# Patient Record
Sex: FEMALE | Employment: FULL TIME | URBAN - METROPOLITAN AREA
[De-identification: names, ages, dates, MRNs, and addresses within clinical notes are randomized per-mention and may not be internally consistent; named-entity substitution may affect disease eponyms.]

---

## 2018-12-03 ENCOUNTER — HOSPITAL ENCOUNTER (OUTPATIENT)
Age: 28
Setting detail: SPECIMEN
Discharge: HOME | End: 2018-12-03
Payer: COMMERCIAL

## 2018-12-03 LAB — HCG QUANTITATIVE: 2511 IU/L

## 2018-12-06 ENCOUNTER — HOSPITAL ENCOUNTER (OUTPATIENT)
Age: 28
Setting detail: SPECIMEN
Discharge: HOME | End: 2018-12-06
Payer: COMMERCIAL

## 2018-12-06 LAB
DIRECT EXAM: ABNORMAL
Lab: ABNORMAL
SPECIMEN DESCRIPTION: ABNORMAL
STATUS: ABNORMAL

## 2018-12-07 LAB
CHLAMYDIA BY THIN PREP: NEGATIVE
N. GONORRHOEAE DNA, THIN PREP: NEGATIVE

## 2018-12-19 LAB — CYTOLOGY REPORT: NORMAL

## 2019-01-13 ENCOUNTER — HOSPITAL ENCOUNTER (EMERGENCY)
Age: 29
Discharge: HOME | End: 2019-01-14
Attending: EMERGENCY MEDICINE
Payer: COMMERCIAL

## 2019-01-13 ENCOUNTER — APPOINTMENT (OUTPATIENT)
Dept: ULTRASOUND IMAGING | Age: 29
End: 2019-01-13
Payer: COMMERCIAL

## 2019-01-13 DIAGNOSIS — O03.4 INCOMPLETE ABORTION: Primary | ICD-10-CM

## 2019-01-13 LAB
ABO: NORMAL
ALBUMIN SERPL-MCNC: 4.3 G/DL (ref 3.5–5.1)
ALP BLD-CCNC: 130 U/L (ref 38–126)
ALT SERPL-CCNC: 154 U/L (ref 11–66)
AMPHETAMINE+METHAMPHETAMINE URINE SCREEN: NEGATIVE
ANION GAP SERPL CALCULATED.3IONS-SCNC: 14 MEQ/L (ref 8–16)
ANTIBODY SCREEN: NORMAL
AST SERPL-CCNC: 95 U/L (ref 5–40)
BACTERIA: ABNORMAL /HPF
BARBITURATE QUANTITATIVE URINE: NEGATIVE
BASOPHILS # BLD: 0.3 %
BASOPHILS ABSOLUTE: 0 THOU/MM3 (ref 0–0.1)
BENZODIAZEPINE QUANTITATIVE URINE: NEGATIVE
BILIRUB SERPL-MCNC: 0.7 MG/DL (ref 0.3–1.2)
BILIRUBIN URINE: NEGATIVE
BLOOD, URINE: ABNORMAL
BUN BLDV-MCNC: 9 MG/DL (ref 7–22)
CALCIUM SERPL-MCNC: 9.6 MG/DL (ref 8.5–10.5)
CANNABINOID QUANTITATIVE URINE: NEGATIVE
CASTS 2: ABNORMAL /LPF
CASTS UA: ABNORMAL /LPF
CHARACTER, URINE: CLEAR
CHLORIDE BLD-SCNC: 104 MEQ/L (ref 98–111)
CO2: 21 MEQ/L (ref 23–33)
COCAINE METABOLITE QUANTITATIVE URINE: NEGATIVE
COLOR: YELLOW
CREAT SERPL-MCNC: 0.5 MG/DL (ref 0.4–1.2)
CRYSTALS, UA: ABNORMAL
EKG ATRIAL RATE: 92 BPM
EKG P AXIS: 64 DEGREES
EKG P-R INTERVAL: 168 MS
EKG Q-T INTERVAL: 364 MS
EKG QRS DURATION: 94 MS
EKG QTC CALCULATION (BAZETT): 450 MS
EKG R AXIS: 83 DEGREES
EKG T AXIS: 62 DEGREES
EKG VENTRICULAR RATE: 92 BPM
EOSINOPHIL # BLD: 0.7 %
EOSINOPHILS ABSOLUTE: 0.1 THOU/MM3 (ref 0–0.4)
EPITHELIAL CELLS, UA: ABNORMAL /HPF
ERYTHROCYTE [DISTWIDTH] IN BLOOD BY AUTOMATED COUNT: 12 % (ref 11.5–14.5)
ERYTHROCYTE [DISTWIDTH] IN BLOOD BY AUTOMATED COUNT: 40.2 FL (ref 35–45)
GFR SERPL CREATININE-BSD FRML MDRD: > 90 ML/MIN/1.73M2
GLUCOSE BLD-MCNC: 106 MG/DL (ref 70–108)
GLUCOSE URINE: NEGATIVE MG/DL
HCG,BETA SUBUNIT,QUAL,SERUM: 2932 MIU/ML (ref 0–5)
HCT VFR BLD CALC: 36.6 % (ref 37–47)
HEMOGLOBIN: 12.8 GM/DL (ref 12–16)
IMMATURE GRANS (ABS): 0.06 THOU/MM3 (ref 0–0.07)
IMMATURE GRANULOCYTES: 0.4 %
KETONES, URINE: ABNORMAL
LEUKOCYTE ESTERASE, URINE: ABNORMAL
LYMPHOCYTES # BLD: 11.5 %
LYMPHOCYTES ABSOLUTE: 1.7 THOU/MM3 (ref 1–4.8)
MAGNESIUM: 1.8 MG/DL (ref 1.6–2.4)
MCH RBC QN AUTO: 32.2 PG (ref 26–33)
MCHC RBC AUTO-ENTMCNC: 35 GM/DL (ref 32.2–35.5)
MCV RBC AUTO: 92.2 FL (ref 81–99)
MISCELLANEOUS 2: ABNORMAL
MONOCYTES # BLD: 7.6 %
MONOCYTES ABSOLUTE: 1.1 THOU/MM3 (ref 0.4–1.3)
NITRITE, URINE: NEGATIVE
NUCLEATED RED BLOOD CELLS: 0 /100 WBC
OPIATES, URINE: NEGATIVE
OSMOLALITY CALCULATION: 276.6 MOSMOL/KG (ref 275–300)
OXYCODONE: NEGATIVE
PH UA: 7
PHENCYCLIDINE QUANTITATIVE URINE: NEGATIVE
PLATELET # BLD: 382 THOU/MM3 (ref 130–400)
PMV BLD AUTO: 9.4 FL (ref 9.4–12.4)
POTASSIUM SERPL-SCNC: 3.8 MEQ/L (ref 3.5–5.2)
PROTEIN UA: NEGATIVE
RBC # BLD: 3.97 MILL/MM3 (ref 4.2–5.4)
RBC URINE: ABNORMAL /HPF
RENAL EPITHELIAL, UA: ABNORMAL
RH FACTOR: NORMAL
SEG NEUTROPHILS: 79.5 %
SEGMENTED NEUTROPHILS ABSOLUTE COUNT: 11.8 THOU/MM3 (ref 1.8–7.7)
SODIUM BLD-SCNC: 139 MEQ/L (ref 135–145)
SPECIFIC GRAVITY, URINE: < 1.005 (ref 1–1.03)
TOTAL PROTEIN: 7.9 G/DL (ref 6.1–8)
UROBILINOGEN, URINE: 0.2 EU/DL
WBC # BLD: 14.8 THOU/MM3 (ref 4.8–10.8)
WBC UA: ABNORMAL /HPF
YEAST: ABNORMAL

## 2019-01-13 PROCEDURE — 87086 URINE CULTURE/COLONY COUNT: CPT

## 2019-01-13 PROCEDURE — 80307 DRUG TEST PRSMV CHEM ANLYZR: CPT

## 2019-01-13 PROCEDURE — 86850 RBC ANTIBODY SCREEN: CPT

## 2019-01-13 PROCEDURE — 36415 COLL VENOUS BLD VENIPUNCTURE: CPT

## 2019-01-13 PROCEDURE — 93005 ELECTROCARDIOGRAM TRACING: CPT | Performed by: PHYSICIAN ASSISTANT

## 2019-01-13 PROCEDURE — 84702 CHORIONIC GONADOTROPIN TEST: CPT

## 2019-01-13 PROCEDURE — 86901 BLOOD TYPING SEROLOGIC RH(D): CPT

## 2019-01-13 PROCEDURE — 81001 URINALYSIS AUTO W/SCOPE: CPT

## 2019-01-13 PROCEDURE — 76801 OB US < 14 WKS SINGLE FETUS: CPT

## 2019-01-13 PROCEDURE — 6370000000 HC RX 637 (ALT 250 FOR IP)

## 2019-01-13 PROCEDURE — 86900 BLOOD TYPING SEROLOGIC ABO: CPT

## 2019-01-13 PROCEDURE — 99284 EMERGENCY DEPT VISIT MOD MDM: CPT

## 2019-01-13 PROCEDURE — 80053 COMPREHEN METABOLIC PANEL: CPT

## 2019-01-13 PROCEDURE — 83735 ASSAY OF MAGNESIUM: CPT

## 2019-01-13 PROCEDURE — 2580000003 HC RX 258: Performed by: PHYSICIAN ASSISTANT

## 2019-01-13 PROCEDURE — 2709999900 HC NON-CHARGEABLE SUPPLY

## 2019-01-13 PROCEDURE — 85025 COMPLETE CBC W/AUTO DIFF WBC: CPT

## 2019-01-13 RX ORDER — ACETAMINOPHEN 325 MG/1
650 TABLET ORAL ONCE
Status: DISCONTINUED | OUTPATIENT
Start: 2019-01-13 | End: 2019-01-13

## 2019-01-13 RX ORDER — 0.9 % SODIUM CHLORIDE 0.9 %
1000 INTRAVENOUS SOLUTION INTRAVENOUS ONCE
Status: COMPLETED | OUTPATIENT
Start: 2019-01-13 | End: 2019-01-14

## 2019-01-13 RX ORDER — ACETAMINOPHEN 325 MG/1
TABLET ORAL
Status: COMPLETED
Start: 2019-01-13 | End: 2019-01-13

## 2019-01-13 RX ORDER — ACETAMINOPHEN 325 MG/1
650 TABLET ORAL ONCE
Status: COMPLETED | OUTPATIENT
Start: 2019-01-13 | End: 2019-01-13

## 2019-01-13 RX ORDER — SODIUM CHLORIDE 9 MG/ML
INJECTION, SOLUTION INTRAVENOUS CONTINUOUS
Status: DISCONTINUED | OUTPATIENT
Start: 2019-01-14 | End: 2019-01-14 | Stop reason: HOSPADM

## 2019-01-13 RX ADMIN — SODIUM CHLORIDE 1000 ML: 9 INJECTION, SOLUTION INTRAVENOUS at 21:34

## 2019-01-13 RX ADMIN — ACETAMINOPHEN 650 MG: 325 TABLET ORAL at 21:37

## 2019-01-13 ASSESSMENT — ENCOUNTER SYMPTOMS
EYE DISCHARGE: 0
NAUSEA: 0
VOMITING: 0
SHORTNESS OF BREATH: 0
WHEEZING: 0
BACK PAIN: 0
SORE THROAT: 0
EYE PAIN: 0
RHINORRHEA: 0
COUGH: 0
DIARRHEA: 0
ABDOMINAL PAIN: 1
CONSTIPATION: 0

## 2019-01-13 ASSESSMENT — PAIN SCALES - GENERAL
PAINLEVEL_OUTOF10: 4
PAINLEVEL_OUTOF10: 8
PAINLEVEL_OUTOF10: 8

## 2019-01-13 ASSESSMENT — PAIN DESCRIPTION - LOCATION: LOCATION: ABDOMEN

## 2019-01-13 ASSESSMENT — PAIN DESCRIPTION - PAIN TYPE: TYPE: ACUTE PAIN

## 2019-01-13 ASSESSMENT — PAIN DESCRIPTION - DESCRIPTORS: DESCRIPTORS: CRAMPING

## 2019-01-14 VITALS
RESPIRATION RATE: 16 BRPM | DIASTOLIC BLOOD PRESSURE: 54 MMHG | BODY MASS INDEX: 26.87 KG/M2 | TEMPERATURE: 98 F | HEIGHT: 62 IN | SYSTOLIC BLOOD PRESSURE: 112 MMHG | HEART RATE: 92 BPM | OXYGEN SATURATION: 98 % | WEIGHT: 146 LBS

## 2019-01-14 PROCEDURE — 6370000000 HC RX 637 (ALT 250 FOR IP): Performed by: EMERGENCY MEDICINE

## 2019-01-14 PROCEDURE — 93010 ELECTROCARDIOGRAM REPORT: CPT | Performed by: NUCLEAR MEDICINE

## 2019-01-14 RX ORDER — MISOPROSTOL 200 UG/1
200 TABLET ORAL EVERY 4 HOURS
Qty: 3 TABLET | Refills: 0 | Status: ON HOLD | OUTPATIENT
Start: 2019-01-14 | End: 2020-01-03 | Stop reason: HOSPADM

## 2019-01-14 RX ORDER — MISOPROSTOL 200 UG/1
200 TABLET ORAL ONCE
Status: COMPLETED | OUTPATIENT
Start: 2019-01-14 | End: 2019-01-14

## 2019-01-14 RX ADMIN — MISOPROSTOL 200 MCG: 200 TABLET ORAL at 00:49

## 2019-01-15 LAB
ORGANISM: ABNORMAL
URINE CULTURE REFLEX: ABNORMAL

## 2020-01-02 ENCOUNTER — HOSPITAL ENCOUNTER (INPATIENT)
Age: 30
LOS: 2 days | Discharge: HOME | End: 2020-01-04
Attending: OBSTETRICS & GYNECOLOGY | Admitting: OBSTETRICS & GYNECOLOGY
Payer: COMMERCIAL

## 2020-01-02 ENCOUNTER — ANESTHESIA (OUTPATIENT)
Dept: LABOR AND DELIVERY | Age: 30
End: 2020-01-02
Payer: COMMERCIAL

## 2020-01-02 ENCOUNTER — ANESTHESIA EVENT (OUTPATIENT)
Dept: LABOR AND DELIVERY | Age: 30
End: 2020-01-02
Payer: COMMERCIAL

## 2020-01-02 ENCOUNTER — APPOINTMENT (OUTPATIENT)
Dept: LABOR AND DELIVERY | Age: 30
End: 2020-01-02
Payer: COMMERCIAL

## 2020-01-02 LAB
ABO: NORMAL
AMPHETAMINE+METHAMPHETAMINE URINE SCREEN: NEGATIVE
ANTIBODY SCREEN: NORMAL
BARBITURATE QUANTITATIVE URINE: NEGATIVE
BASOPHILS # BLD: 0.3 %
BASOPHILS ABSOLUTE: 0 THOU/MM3 (ref 0–0.1)
BENZODIAZEPINE QUANTITATIVE URINE: NEGATIVE
CANNABINOID QUANTITATIVE URINE: NEGATIVE
COCAINE METABOLITE QUANTITATIVE URINE: NEGATIVE
EOSINOPHIL # BLD: 2 %
EOSINOPHILS ABSOLUTE: 0.2 THOU/MM3 (ref 0–0.4)
ERYTHROCYTE [DISTWIDTH] IN BLOOD BY AUTOMATED COUNT: 13.2 % (ref 11.5–14.5)
ERYTHROCYTE [DISTWIDTH] IN BLOOD BY AUTOMATED COUNT: 46.2 FL (ref 35–45)
HCT VFR BLD CALC: 34.1 % (ref 37–47)
HEMOGLOBIN: 11.7 GM/DL (ref 12–16)
IMMATURE GRANS (ABS): 0.08 THOU/MM3 (ref 0–0.07)
IMMATURE GRANULOCYTES: 0.7 %
LYMPHOCYTES # BLD: 22.4 %
LYMPHOCYTES ABSOLUTE: 2.7 THOU/MM3 (ref 1–4.8)
MCH RBC QN AUTO: 32.9 PG (ref 26–33)
MCHC RBC AUTO-ENTMCNC: 34.3 GM/DL (ref 32.2–35.5)
MCV RBC AUTO: 95.8 FL (ref 81–99)
MONOCYTES # BLD: 10.7 %
MONOCYTES ABSOLUTE: 1.3 THOU/MM3 (ref 0.4–1.3)
NUCLEATED RED BLOOD CELLS: 0 /100 WBC
OPIATES, URINE: NEGATIVE
OXYCODONE: NEGATIVE
PHENCYCLIDINE QUANTITATIVE URINE: NEGATIVE
PLATELET # BLD: 272 THOU/MM3 (ref 130–400)
PMV BLD AUTO: 10.7 FL (ref 9.4–12.4)
RBC # BLD: 3.56 MILL/MM3 (ref 4.2–5.4)
RH FACTOR: NORMAL
RPR: NONREACTIVE
SEG NEUTROPHILS: 63.9 %
SEGMENTED NEUTROPHILS ABSOLUTE COUNT: 7.8 THOU/MM3 (ref 1.8–7.7)
WBC # BLD: 12.2 THOU/MM3 (ref 4.8–10.8)

## 2020-01-02 PROCEDURE — 6360000002 HC RX W HCPCS

## 2020-01-02 PROCEDURE — 86901 BLOOD TYPING SEROLOGIC RH(D): CPT

## 2020-01-02 PROCEDURE — 2580000003 HC RX 258: Performed by: OBSTETRICS & GYNECOLOGY

## 2020-01-02 PROCEDURE — 3700000025 EPIDURAL BLOCK: Performed by: ANESTHESIOLOGY

## 2020-01-02 PROCEDURE — 2709999900 HC NON-CHARGEABLE SUPPLY

## 2020-01-02 PROCEDURE — 80307 DRUG TEST PRSMV CHEM ANLYZR: CPT

## 2020-01-02 PROCEDURE — 90715 TDAP VACCINE 7 YRS/> IM: CPT | Performed by: OBSTETRICS & GYNECOLOGY

## 2020-01-02 PROCEDURE — 36415 COLL VENOUS BLD VENIPUNCTURE: CPT

## 2020-01-02 PROCEDURE — 85025 COMPLETE CBC W/AUTO DIFF WBC: CPT

## 2020-01-02 PROCEDURE — 6360000002 HC RX W HCPCS: Performed by: OBSTETRICS & GYNECOLOGY

## 2020-01-02 PROCEDURE — 86900 BLOOD TYPING SEROLOGIC ABO: CPT

## 2020-01-02 PROCEDURE — 6360000002 HC RX W HCPCS: Performed by: NURSE ANESTHETIST, CERTIFIED REGISTERED

## 2020-01-02 PROCEDURE — 88307 TISSUE EXAM BY PATHOLOGIST: CPT

## 2020-01-02 PROCEDURE — 10907ZC DRAINAGE OF AMNIOTIC FLUID, THERAPEUTIC FROM PRODUCTS OF CONCEPTION, VIA NATURAL OR ARTIFICIAL OPENING: ICD-10-PCS | Performed by: OBSTETRICS & GYNECOLOGY

## 2020-01-02 PROCEDURE — 1200000000 HC SEMI PRIVATE

## 2020-01-02 PROCEDURE — 90471 IMMUNIZATION ADMIN: CPT | Performed by: OBSTETRICS & GYNECOLOGY

## 2020-01-02 PROCEDURE — 6370000000 HC RX 637 (ALT 250 FOR IP): Performed by: OBSTETRICS & GYNECOLOGY

## 2020-01-02 PROCEDURE — 86592 SYPHILIS TEST NON-TREP QUAL: CPT

## 2020-01-02 PROCEDURE — 7200000001 HC VAGINAL DELIVERY

## 2020-01-02 PROCEDURE — 86850 RBC ANTIBODY SCREEN: CPT

## 2020-01-02 PROCEDURE — 2500000003 HC RX 250 WO HCPCS: Performed by: ANESTHESIOLOGY

## 2020-01-02 PROCEDURE — 3E033VJ INTRODUCTION OF OTHER HORMONE INTO PERIPHERAL VEIN, PERCUTANEOUS APPROACH: ICD-10-PCS | Performed by: OBSTETRICS & GYNECOLOGY

## 2020-01-02 RX ORDER — DIPHENHYDRAMINE HYDROCHLORIDE 50 MG/ML
25 INJECTION INTRAMUSCULAR; INTRAVENOUS EVERY 4 HOURS PRN
Status: DISCONTINUED | OUTPATIENT
Start: 2020-01-02 | End: 2020-01-02 | Stop reason: HOSPADM

## 2020-01-02 RX ORDER — DOCUSATE SODIUM 100 MG/1
100 CAPSULE, LIQUID FILLED ORAL 2 TIMES DAILY
Status: DISCONTINUED | OUTPATIENT
Start: 2020-01-02 | End: 2020-01-04 | Stop reason: HOSPADM

## 2020-01-02 RX ORDER — SODIUM CHLORIDE 0.9 % (FLUSH) 0.9 %
10 SYRINGE (ML) INJECTION EVERY 12 HOURS SCHEDULED
Status: DISCONTINUED | OUTPATIENT
Start: 2020-01-02 | End: 2020-01-04 | Stop reason: HOSPADM

## 2020-01-02 RX ORDER — SODIUM CHLORIDE 0.9 % (FLUSH) 0.9 %
10 SYRINGE (ML) INJECTION PRN
Status: DISCONTINUED | OUTPATIENT
Start: 2020-01-02 | End: 2020-01-04 | Stop reason: HOSPADM

## 2020-01-02 RX ORDER — NALOXONE HYDROCHLORIDE 0.4 MG/ML
0.4 INJECTION, SOLUTION INTRAMUSCULAR; INTRAVENOUS; SUBCUTANEOUS PRN
Status: DISCONTINUED | OUTPATIENT
Start: 2020-01-02 | End: 2020-01-02 | Stop reason: HOSPADM

## 2020-01-02 RX ORDER — ROPIVACAINE HYDROCHLORIDE 2 MG/ML
INJECTION, SOLUTION EPIDURAL; INFILTRATION; PERINEURAL
Status: COMPLETED
Start: 2020-01-02 | End: 2020-01-02

## 2020-01-02 RX ORDER — BUTORPHANOL TARTRATE 1 MG/ML
1 INJECTION, SOLUTION INTRAMUSCULAR; INTRAVENOUS
Status: DISCONTINUED | OUTPATIENT
Start: 2020-01-02 | End: 2020-01-02 | Stop reason: HOSPADM

## 2020-01-02 RX ORDER — FERROUS SULFATE 325(65) MG
325 TABLET ORAL
Status: DISCONTINUED | OUTPATIENT
Start: 2020-01-03 | End: 2020-01-04 | Stop reason: HOSPADM

## 2020-01-02 RX ORDER — METHYLERGONOVINE MALEATE 0.2 MG/ML
200 INJECTION INTRAVENOUS PRN
Status: DISCONTINUED | OUTPATIENT
Start: 2020-01-02 | End: 2020-01-02 | Stop reason: HOSPADM

## 2020-01-02 RX ORDER — LANOLIN 100 %
OINTMENT (GRAM) TOPICAL PRN
Status: DISCONTINUED | OUTPATIENT
Start: 2020-01-02 | End: 2020-01-04 | Stop reason: HOSPADM

## 2020-01-02 RX ORDER — DIPHENHYDRAMINE HCL 25 MG
25 TABLET ORAL EVERY 6 HOURS PRN
Status: DISCONTINUED | OUTPATIENT
Start: 2020-01-02 | End: 2020-01-04 | Stop reason: HOSPADM

## 2020-01-02 RX ORDER — MORPHINE SULFATE 2 MG/ML
2 INJECTION, SOLUTION INTRAMUSCULAR; INTRAVENOUS
Status: DISCONTINUED | OUTPATIENT
Start: 2020-01-02 | End: 2020-01-02 | Stop reason: HOSPADM

## 2020-01-02 RX ORDER — SODIUM CHLORIDE, SODIUM LACTATE, POTASSIUM CHLORIDE, CALCIUM CHLORIDE 600; 310; 30; 20 MG/100ML; MG/100ML; MG/100ML; MG/100ML
INJECTION, SOLUTION INTRAVENOUS CONTINUOUS
Status: DISCONTINUED | OUTPATIENT
Start: 2020-01-02 | End: 2020-01-02

## 2020-01-02 RX ORDER — ROPIVACAINE HYDROCHLORIDE 2 MG/ML
INJECTION, SOLUTION EPIDURAL; INFILTRATION; PERINEURAL PRN
Status: DISCONTINUED | OUTPATIENT
Start: 2020-01-02 | End: 2020-01-02 | Stop reason: SDUPTHER

## 2020-01-02 RX ORDER — MORPHINE SULFATE 4 MG/ML
4 INJECTION, SOLUTION INTRAMUSCULAR; INTRAVENOUS
Status: DISCONTINUED | OUTPATIENT
Start: 2020-01-02 | End: 2020-01-02 | Stop reason: HOSPADM

## 2020-01-02 RX ORDER — CARBOPROST TROMETHAMINE 250 UG/ML
250 INJECTION, SOLUTION INTRAMUSCULAR PRN
Status: CANCELLED | OUTPATIENT
Start: 2020-01-02

## 2020-01-02 RX ORDER — ONDANSETRON 4 MG/1
8 TABLET, FILM COATED ORAL EVERY 8 HOURS PRN
Status: DISCONTINUED | OUTPATIENT
Start: 2020-01-02 | End: 2020-01-04 | Stop reason: HOSPADM

## 2020-01-02 RX ORDER — IBUPROFEN 800 MG/1
800 TABLET ORAL EVERY 8 HOURS PRN
Status: DISCONTINUED | OUTPATIENT
Start: 2020-01-02 | End: 2020-01-02 | Stop reason: HOSPADM

## 2020-01-02 RX ORDER — MISOPROSTOL 200 UG/1
1000 TABLET ORAL PRN
Status: DISCONTINUED | OUTPATIENT
Start: 2020-01-02 | End: 2020-01-02 | Stop reason: HOSPADM

## 2020-01-02 RX ORDER — ONDANSETRON 2 MG/ML
8 INJECTION INTRAMUSCULAR; INTRAVENOUS EVERY 6 HOURS PRN
Status: DISCONTINUED | OUTPATIENT
Start: 2020-01-02 | End: 2020-01-02 | Stop reason: HOSPADM

## 2020-01-02 RX ORDER — SODIUM CHLORIDE, SODIUM LACTATE, POTASSIUM CHLORIDE, CALCIUM CHLORIDE 600; 310; 30; 20 MG/100ML; MG/100ML; MG/100ML; MG/100ML
INJECTION, SOLUTION INTRAVENOUS CONTINUOUS
Status: DISCONTINUED | OUTPATIENT
Start: 2020-01-02 | End: 2020-01-04 | Stop reason: HOSPADM

## 2020-01-02 RX ORDER — ONDANSETRON 2 MG/ML
4 INJECTION INTRAMUSCULAR; INTRAVENOUS EVERY 6 HOURS PRN
Status: DISCONTINUED | OUTPATIENT
Start: 2020-01-02 | End: 2020-01-02 | Stop reason: HOSPADM

## 2020-01-02 RX ORDER — ONDANSETRON 2 MG/ML
4 INJECTION INTRAMUSCULAR; INTRAVENOUS EVERY 6 HOURS PRN
Status: DISCONTINUED | OUTPATIENT
Start: 2020-01-02 | End: 2020-01-04 | Stop reason: HOSPADM

## 2020-01-02 RX ORDER — ACETAMINOPHEN 325 MG/1
650 TABLET ORAL EVERY 4 HOURS PRN
Status: DISCONTINUED | OUTPATIENT
Start: 2020-01-02 | End: 2020-01-04 | Stop reason: HOSPADM

## 2020-01-02 RX ORDER — TERBUTALINE SULFATE 1 MG/ML
0.25 INJECTION, SOLUTION SUBCUTANEOUS
Status: DISCONTINUED | OUTPATIENT
Start: 2020-01-02 | End: 2020-01-02 | Stop reason: HOSPADM

## 2020-01-02 RX ORDER — ACETAMINOPHEN 325 MG/1
650 TABLET ORAL EVERY 4 HOURS PRN
Status: DISCONTINUED | OUTPATIENT
Start: 2020-01-02 | End: 2020-01-02 | Stop reason: HOSPADM

## 2020-01-02 RX ORDER — NALBUPHINE HCL 10 MG/ML
5 AMPUL (ML) INJECTION EVERY 4 HOURS PRN
Status: DISCONTINUED | OUTPATIENT
Start: 2020-01-02 | End: 2020-01-02 | Stop reason: HOSPADM

## 2020-01-02 RX ORDER — SEVOFLURANE 250 ML/250ML
1 LIQUID RESPIRATORY (INHALATION) CONTINUOUS PRN
Status: DISCONTINUED | OUTPATIENT
Start: 2020-01-02 | End: 2020-01-02 | Stop reason: HOSPADM

## 2020-01-02 RX ORDER — IBUPROFEN 800 MG/1
800 TABLET ORAL EVERY 8 HOURS
Status: DISCONTINUED | OUTPATIENT
Start: 2020-01-02 | End: 2020-01-04 | Stop reason: HOSPADM

## 2020-01-02 RX ORDER — SODIUM CHLORIDE 0.9 % (FLUSH) 0.9 %
10 SYRINGE (ML) INJECTION EVERY 12 HOURS SCHEDULED
Status: DISCONTINUED | OUTPATIENT
Start: 2020-01-02 | End: 2020-01-02

## 2020-01-02 RX ORDER — LIDOCAINE HYDROCHLORIDE 10 MG/ML
30 INJECTION, SOLUTION EPIDURAL; INFILTRATION; INTRACAUDAL; PERINEURAL PRN
Status: DISCONTINUED | OUTPATIENT
Start: 2020-01-02 | End: 2020-01-02 | Stop reason: HOSPADM

## 2020-01-02 RX ORDER — METHYLERGONOVINE MALEATE 0.2 MG/ML
200 INJECTION INTRAVENOUS PRN
Status: DISCONTINUED | OUTPATIENT
Start: 2020-01-02 | End: 2020-01-04 | Stop reason: HOSPADM

## 2020-01-02 RX ORDER — SODIUM CHLORIDE 0.9 % (FLUSH) 0.9 %
10 SYRINGE (ML) INJECTION PRN
Status: DISCONTINUED | OUTPATIENT
Start: 2020-01-02 | End: 2020-01-02

## 2020-01-02 RX ORDER — CARBOPROST TROMETHAMINE 250 UG/ML
250 INJECTION, SOLUTION INTRAMUSCULAR
Status: ACTIVE | OUTPATIENT
Start: 2020-01-02 | End: 2020-01-02

## 2020-01-02 RX ORDER — ZOLPIDEM TARTRATE 5 MG/1
5 TABLET ORAL NIGHTLY PRN
Status: DISCONTINUED | OUTPATIENT
Start: 2020-01-02 | End: 2020-01-04 | Stop reason: HOSPADM

## 2020-01-02 RX ADMIN — DOCUSATE SODIUM 100 MG: 100 CAPSULE, LIQUID FILLED ORAL at 19:50

## 2020-01-02 RX ADMIN — ROPIVACAINE HYDROCHLORIDE 10 ML: 2 INJECTION, SOLUTION EPIDURAL; INFILTRATION at 09:16

## 2020-01-02 RX ADMIN — SODIUM CHLORIDE, POTASSIUM CHLORIDE, SODIUM LACTATE AND CALCIUM CHLORIDE: 600; 310; 30; 20 INJECTION, SOLUTION INTRAVENOUS at 09:34

## 2020-01-02 RX ADMIN — Medication 1 MILLI-UNITS/MIN: at 06:35

## 2020-01-02 RX ADMIN — Medication 18 ML/HR: at 09:18

## 2020-01-02 RX ADMIN — SODIUM CHLORIDE, POTASSIUM CHLORIDE, SODIUM LACTATE AND CALCIUM CHLORIDE: 600; 310; 30; 20 INJECTION, SOLUTION INTRAVENOUS at 06:20

## 2020-01-02 RX ADMIN — TETANUS TOXOID, REDUCED DIPHTHERIA TOXOID AND ACELLULAR PERTUSSIS VACCINE, ADSORBED 0.5 ML: 5; 2.5; 8; 8; 2.5 SUSPENSION INTRAMUSCULAR at 19:50

## 2020-01-02 RX ADMIN — IBUPROFEN 800 MG: 800 TABLET, FILM COATED ORAL at 16:22

## 2020-01-02 ASSESSMENT — PAIN SCALES - GENERAL: PAINLEVEL_OUTOF10: 5

## 2020-01-02 NOTE — PLAN OF CARE
Problem: Pain:  Goal: Pain level will decrease  Description  Pain level will decrease  Outcome: Ongoing  Note:   Scheduled motrin given. Pain goal noted to be a 3. Problem: Discharge Planning:  Goal: Discharged to appropriate level of care  Description  Discharged to appropriate level of care  Outcome: Ongoing  Note:   Plan is to be discharged home with significant other. Problem: Constipation:  Goal: Bowel elimination is within specified parameters  Description  Bowel elimination is within specified parameters  Outcome: Ongoing  Note:   Patient has not passed gas at this time. Problem: Fluid Volume - Imbalance:  Goal: Absence of imbalanced fluid volume signs and symptoms  Description  Absence of imbalanced fluid volume signs and symptoms  Outcome: Ongoing  Note:   Vaginal bleeding WNL, no clots or foul odors. Problem: Infection - Risk of, Puerperal Infection:  Goal: Will show no infection signs and symptoms  Description  Will show no infection signs and symptoms  Outcome: Ongoing  Note:   Afebrile this shift. Problem: Mood - Altered:  Goal: Mood stable  Description  Mood stable  Outcome: Ongoing  Note:   Emotional support provided. Care plan reviewed with patient and she contributes to goal setting and voices understanding of plan of care.

## 2020-01-02 NOTE — FLOWSHEET NOTE
Patient arrived to [phone] via wheelchair with  in arms. Report received from [address] [place] [address]. Patient oriented to room, plan of care, call light, pain goal, ducks in a row, rooming in. Trice Londono  Patient up to bathroom at this time. Successful void. Small amount of lochia noted no clots. Patient back to bed. Fundus midline firm at U/-1. IV out per order.  Trice Nolan

## 2020-01-02 NOTE — PLAN OF CARE
appropriate level of care  Description  Discharged to appropriate level of care  Outcome: Ongoing  Note:   Discussed infant care and breast feeding. Care plan reviewed with patient and family. Patient and family verbalize understanding of the plan of care and contribute to goal setting.

## 2020-01-02 NOTE — FLOWSHEET NOTE
Pt arrives for scheduled induction. Pt endorses +FM, denies contractions, vaginal bleeding or lof. Patient to BR to void, informed of maternal drug testing policy in place on all laboring patients. Consent signed and urine sent.

## 2020-01-02 NOTE — H&P
[address] [address]  History and Physical Update    Pt Name: Josue Eller  MRN: [id number]  YOB: 1990  Date of evaluation: 2020    [x] I have examined the patient and reviewed the H&P/Consult and there are no changes to the patient or plans. 28yo  at 38/4 weeks here for IOL due to IUGR 8th% and resistive cord dopplers. FHT category 1. AROM for clear fluid return. GBS negative.     [] I have examined the patient and reviewed the H&P/Consult and have noted the following changes:            Jaja Gilliland MD  Electronically signed 2020 at 8:39 AM

## 2020-01-02 NOTE — FLOWSHEET NOTE
Recovery period ended. Pt to mother baby per wheelchair. Report to April RN and care assumed. Cond stable.

## 2020-01-02 NOTE — ANESTHESIA PRE PROCEDURE
Department of Anesthesiology  Preprocedure Note       Name:  Jean Humphries   Age:  34 y.o.  :  1990                                          MRN:  [id number]         Date:  2020      Surgeon: * No surgeons listed *    Procedure: Labor Analgesia    Medications prior to admission:   Prior to Admission medications    Medication Sig Start Date End Date Taking?  Authorizing Provider   Prenatal Vit-Fe Fumarate-FA (PRENATAL 1+1 PO) Take by mouth   Yes Historical Provider, MD   misoprostol (CYTOTEC) 200 MCG tablet Take 1 tablet by mouth every 4 hours for 3 doses 1/14/19 1/15/19  Shikha Sim MD       Current medications:    Current Facility-Administered Medications   Medication Dose Route Frequency Provider Last Rate Last Dose    lactated ringers infusion   Intravenous Continuous Buddy Miguel  mL/hr at 20 0620      sodium chloride flush 0.9 % injection 10 mL  10 mL Intravenous 2 times per day Buddy Miguel MD        sodium chloride flush 0.9 % injection 10 mL  10 mL Intravenous PRN Buddy Miguel MD        lidocaine PF 1 % injection 30 mL  30 mL Other PRN Buddy Miguel MD        butorphanol (STADOL) injection 1 mg  1 mg Intravenous Q1H PRN Buddy Miguel MD        nitrous oxide 50% inhalation 1 each  1 each Inhalation Continuous PRN MD Hortencia Reza acetaminophen (TYLENOL) tablet 650 mg  650 mg Oral Q4H PRN Buddy Miguel MD        ibuprofen (ADVIL;MOTRIN) tablet 800 mg  800 mg Oral Q8H PRN Buddy Miguel MD        morphine (PF) injection 2 mg  2 mg Intravenous Q2H PRN MD Robert Reza morphine injection 4 mg  4 mg Intravenous Q2H PRN Buddy Miguel MD        oxytocin (PITOCIN) 30 units in 500 mL infusion  1 fred-units/min Intravenous Continuous Buddy Miguel MD 4 mL/hr at 20 0755 4 fred-units/min at 20 0755    diphenhydrAMINE (BENADRYL) injection 25 mg  25 mg Intravenous Q4H PRN Buddy Miguel MD        ondansetron [facility]) injection 8 mg  8 mg

## 2020-01-02 NOTE — PLAN OF CARE
Problem: Anxiety:  Goal: Level of anxiety will decrease  Description  Level of anxiety will decrease  1/2/2020 0747 by Ric Rapp RN  Note:   Pt calm and cooperative. Denies needs or complaints. Problem: Breathing Pattern - Ineffective:  Goal: Able to breathe comfortably  Description  Able to breathe comfortably  1/2/2020 0747 by Ric Rapp RN  Note:   Color pink. Resp with ease. Pulse Ox WNL     Problem: Fluid Volume - Imbalance:  Goal: Absence of intrapartum hemorrhage signs and symptoms  Description  Absence of intrapartum hemorrhage signs and symptoms  1/2/2020 0747 by Ric Rapp RN  Note:   No signs of hemorrhage, labs stable     Problem: Infection - Intrapartum Infection:  Goal: Will show no infection signs and symptoms  Description  Will show no infection signs and symptoms  1/2/2020 0747 by Ric Rapp RN  Note:   Afebrile, no signs of infection     Problem: Labor Process - Prolonged:  Goal: Uterine contractions within specified parameters  Description  Uterine contractions within specified parameters  1/2/2020 0747 by Ric Rapp RN  Note:   Having irregular contractions with pitocin running     Problem: Pain - Acute:  Goal: Pain level will decrease  Description  Pain level will decrease  1/2/2020 0747 by Ric Rapp RN  Note:   Pain goal 10. Planning epidural for pain.  Denies pain at the current time     Problem: Tissue Perfusion - Uteroplacental, Altered:  Goal: Absence of abnormal fetal heart rate pattern  Description  Absence of abnormal fetal heart rate pattern  1/2/2020 0747 by Ric Rapp RN  Note:   Reactive FHR tracing     Problem: Urinary Retention:  Goal: Urinary elimination within specified parameters  Description  Urinary elimination within specified parameters  1/2/2020 0747 by Ric Rapp RN  Note:   Voids freely  Problem: Discharge Planning:  Goal: Discharged to appropriate level of care  Description  Discharged to appropriate level of care  1/2/2020

## 2020-01-02 NOTE — L&D DELIVERY NOTE
Department of Obstetrics and Gynecology   Vaginal Delivery Note at [facility]  Date of Delivery:  2020    Procedure:  Spontaneous vaginal delivery    Surgeon:   CYNTHIA Gil    Past Medical History:   Past Medical History:   Diagnosis Date    Anxiety     Mental disorder        Anesthesia:  epidural anesthesia    Estimated blood loss:  300ml    Specimen:  Placenta not sent to pathology     Cord blood sent Yes    Complications:  none    Condition:  infant stable to general nursery and mother stable    Details of Procedure: The patient is a 34 y.o. female at 38w3d   OB History        3    Para   1    Term   1            AB   1    Living   1       SAB   1    TAB        Ectopic        Molar        Multiple        Live Births   1             who was admitted for induction. She received the following interventions: ARBOW and IV Pitocin induction. The patient progressed well,did receive an epidural, became complete and started to push. Patient progressed well and the fetal head was delivered over an intact perineum, nose and mouth suctioned with bulb suction and the rest of the infant delivered atraumatically, placed on mother abdomen. Cord was clamped and cut and infant handed off to the waiting nurse for evaluation. The delivery of the placenta was spontaneous. The perineum and vagina were explored and a no  laceration was repaired. Vaginal sweep was performed and there were no sponges retained in the vagina. All sharps were discarded.     Infant Wt:   Information for the patient's :  Kathryn Race [[id number]]             APGARS:     Information for the patient's :  Kathryn Race [[id number]]          Kendell Costa

## 2020-01-03 PROCEDURE — 1200000000 HC SEMI PRIVATE

## 2020-01-03 PROCEDURE — 6370000000 HC RX 637 (ALT 250 FOR IP): Performed by: OBSTETRICS & GYNECOLOGY

## 2020-01-03 RX ORDER — PSEUDOEPHEDRINE HCL 30 MG
100 TABLET ORAL 2 TIMES DAILY PRN
COMMUNITY
Start: 2020-01-03

## 2020-01-03 RX ORDER — IBUPROFEN 200 MG
800 TABLET ORAL EVERY 8 HOURS PRN
COMMUNITY
Start: 2020-01-03

## 2020-01-03 RX ADMIN — DOCUSATE SODIUM 100 MG: 100 CAPSULE, LIQUID FILLED ORAL at 21:06

## 2020-01-03 RX ADMIN — IBUPROFEN 800 MG: 800 TABLET, FILM COATED ORAL at 00:21

## 2020-01-03 RX ADMIN — ACETAMINOPHEN 650 MG: 325 TABLET ORAL at 15:21

## 2020-01-03 RX ADMIN — IBUPROFEN 800 MG: 800 TABLET, FILM COATED ORAL at 08:15

## 2020-01-03 RX ADMIN — ACETAMINOPHEN 650 MG: 325 TABLET ORAL at 23:51

## 2020-01-03 RX ADMIN — IBUPROFEN 800 MG: 800 TABLET, FILM COATED ORAL at 19:00

## 2020-01-03 ASSESSMENT — PAIN SCALES - GENERAL
PAINLEVEL_OUTOF10: 5
PAINLEVEL_OUTOF10: 3
PAINLEVEL_OUTOF10: 5
PAINLEVEL_OUTOF10: 8

## 2020-01-03 NOTE — DISCHARGE SUMMARY
Vaginal Delivery Discharge Summary    Gestational Age:38w4d    Antepartum complications: IUGR, abnormal fetal dopplers    Date of Delivery: 1/3/2020     Condition: Good     Type of Delivery: Vaginal     [place] Data  Information for the patient's :  Sweetie Husain [[id number]]   male  Birth Weight: 6 lb 1.7 oz (2.77 kg)      Labs: CBC   Lab Results   Component Value Date    HGB 11.7 (L) 2020    HCT 34.1 (L) 2020        Intrapartum complications: None    Postpartum complications: none    The patient is ambulating well. The patient is tolerating a normal diet.       Discharge Date: 1/3/2020    Plan:   Follow up in the office in 6 weeks    [address] [place] [address]

## 2020-01-03 NOTE — PROGRESS NOTES
Department of Obstetrics and Gynecology  Labor and Delivery  Attending Post Partum Progress Note    SUBJECTIVE:  PPD# 1    OBJECTIVE: Doing well, normal lochia     Vitals:  /60   Pulse 81   Temp 98.6 °F (37 °C) (Oral)   Resp 18   Ht 5' 2\" (1.575 m)   Wt 145 lb (65.8 kg)   SpO2 100%   Breastfeeding?  Unknown   BMI 26.52 kg/m²     ABDOMEN:  Soft, NT, ND, fundus firm      DATA:    Hemoglobin:   Lab Results   Component Value Date    HGB 11.7 01/02/2020    HCT 34.1 01/02/2020       ASSESSMENT & PLAN:    PPD#1  - Plan D/C home tomorrow    [facility]

## 2020-01-03 NOTE — PLAN OF CARE
Problem: Pain:  Goal: Pain level will decrease  Description  Pain level will decrease  1/3/2020 0059 by Jovanni Mena RN  Outcome: Ongoing  Note:   States relief with oral pain medication     Problem: Pain:  Goal: Control of acute pain  Description  Control of acute pain  Outcome: Ongoing  Note:   Using oral medication for relief of pain     Problem: Discharge Planning:  Goal: Discharged to appropriate level of care  Description  Discharged to appropriate level of care  1/3/2020 0059 by Jovanni Mena RN  Outcome: Ongoing  Note:   Discharge not anticipated today     Problem: Constipation:  Goal: Bowel elimination is within specified parameters  Description  Bowel elimination is within specified parameters  1/3/2020 0059 by Jovanni Mena RN  Outcome: Ongoing  Note:   Taking stool softeners, passing gas     Problem: Fluid Volume - Imbalance:  Goal: Absence of imbalanced fluid volume signs and symptoms  Description  Absence of imbalanced fluid volume signs and symptoms  1/3/2020 0059 by Jovanni Mena RN  Outcome: Ongoing  Note:   No signs of fluid imbalance     Problem: Fluid Volume - Imbalance:  Goal: Absence of postpartum hemorrhage signs and symptoms  Description  Absence of postpartum hemorrhage signs and symptoms  Outcome: Ongoing  Note:   Small amount of lochia     Problem: Infection - Risk of, Puerperal Infection:  Goal: Will show no infection signs and symptoms  Description  Will show no infection signs and symptoms  1/3/2020 0059 by Jovanni Mena RN  Outcome: Ongoing  Note:   No signs of infection     Problem: Mood - Altered:  Goal: Mood stable  Description  Mood stable  1/3/2020 0059 by Jovanni Mena RN  Outcome: Ongoing  Note:   Pleasant and cooperative   Care plan reviewed with patient and SO. Patient and SO verbalize understanding of the plan of care and contribute to goal setting.

## 2020-01-03 NOTE — PLAN OF CARE
Problem: Pain:  Goal: Pain level will decrease  Description  Pain level will decrease  1/3/2020 0905 by Julia Florentino RN  Outcome: Ongoing  Note:   Pain has decreased since yesterday      Problem: Pain:  Goal: Control of acute pain  Description  Control of acute pain  1/3/2020 0905 by Julia Florentino RN  Outcome: Ongoing  Note:   Pain is well controlled with Motrin      Problem: Pain:  Goal: Control of chronic pain  Description  Control of chronic pain  Outcome: Ongoing     Problem: Discharge Planning:  Goal: Discharged to appropriate level of care  Description  Discharged to appropriate level of care  1/3/2020 0905 by Apryl Garcia RN  Outcome: Ongoing  Note:   Discharge not anticipated for today     Problem: Constipation:  Goal: Bowel elimination is within specified parameters  Description  Bowel elimination is within specified parameters  1/3/2020 0905 by Julia Florentino RN  Outcome: Ongoing  Note:   Pt refuses stool softener this morning      Problem: Fluid Volume - Imbalance:  Goal: Absence of postpartum hemorrhage signs and symptoms  Description  Absence of postpartum hemorrhage signs and symptoms  1/3/2020 0905 by Julia Florentino RN  Outcome: Ongoing  Note:   Small amt vaginal bleeding      Problem: Infection - Risk of, Puerperal Infection:  Goal: Will show no infection signs and symptoms  Description  Will show no infection signs and symptoms  1/3/2020 0905 by Apryl Garcia RN  Outcome: Ongoing  Note:   Vital signs WNL, no sign of infection     Problem: Mood - Altered:  Goal: Mood stable  Description  Mood stable  1/3/2020 0905 by Apryl Garcia RN  Outcome: Ongoing  Note:   Pt is cheerful and appropriate    Care plan reviewed with patient. Patient verbalizes understanding of the plan of care and contribute to goal setting.

## 2020-01-03 NOTE — ANESTHESIA POSTPROCEDURE EVALUATION
Department of Anesthesiology  Postprocedure Note    Patient: Glo Jeffrey  MRN: [id number]  YOB: 1990  Date of evaluation: 1/3/2020  Time:  3:11 PM     Procedure Summary     Date:  01/02/20 Room / Location:      Anesthesia Start:  0903 Anesthesia Stop:  1320    Procedure:  Labor Analgesia Diagnosis:      Scheduled Providers:   Responsible Provider:  Yahir Enciso DO    Anesthesia Type:  epidural ASA Status:  2          Anesthesia Type: epidural    Redd Phase I: Redd Score: 10    Redd Phase II: Redd Score: 10    Last vitals: Reviewed and per EMR flowsheets.        Anesthesia Post Evaluation    Patient location during evaluation: floor  Patient participation: complete - patient participated  Level of consciousness: awake  Airway patency: patent  Nausea & Vomiting: no vomiting and no nausea  Complications: no  Cardiovascular status: hemodynamically stable  Respiratory status: acceptable  Hydration status: stable

## 2020-01-04 VITALS
DIASTOLIC BLOOD PRESSURE: 70 MMHG | SYSTOLIC BLOOD PRESSURE: 129 MMHG | BODY MASS INDEX: 26.68 KG/M2 | HEIGHT: 62 IN | OXYGEN SATURATION: 100 % | TEMPERATURE: 98.4 F | WEIGHT: 145 LBS | HEART RATE: 84 BPM | RESPIRATION RATE: 16 BRPM

## 2020-01-04 PROCEDURE — 6370000000 HC RX 637 (ALT 250 FOR IP): Performed by: OBSTETRICS & GYNECOLOGY

## 2020-01-04 RX ADMIN — IBUPROFEN 800 MG: 800 TABLET, FILM COATED ORAL at 06:50

## 2020-01-04 ASSESSMENT — PAIN SCALES - GENERAL
PAINLEVEL_OUTOF10: 5
PAINLEVEL_OUTOF10: 3

## 2020-01-04 NOTE — FLOWSHEET NOTE
Post birth warning signs education paper given and reviewed, teaching complete. [place] postpartum depression screening discussed with patient, instructed to contact her healthcare provider if her score is > 10. Patient voiced understanding. Mother's blood type is A+.

## 2020-01-04 NOTE — PLAN OF CARE
Problem: Pain:  Goal: Pain level will decrease  Description  Pain level will decrease  Outcome: Ongoing  Note:   Pain controlled with po meds. Discussed ice for perineal pain or the use of warm blanket/heating pad for uterine cramps. Pt states her pain goal 3/10 has been met. Problem: Pain:  Goal: Control of acute pain  Description  Control of acute pain  Outcome: Ongoing  Note:   Pain controlled with po meds. Discussed ice for perineal pain or the use of warm blanket/heating pad for uterine cramps. Pt states her pain goal 3/10 has been met. Problem: Discharge Planning:  Goal: Discharged to appropriate level of care  Description  Discharged to appropriate level of care  Outcome: Ongoing  Note:   Remains in hospital, discussed possible discharge needs. Problem: Constipation:  Goal: Bowel elimination is within specified parameters  Description  Bowel elimination is within specified parameters  Outcome: Ongoing  Note:   Taking stool softeners and increasing fiber and fluid in diet. Ambulation encouraged. Problem: Fluid Volume - Imbalance:  Goal: Absence of postpartum hemorrhage signs and symptoms  Description  Absence of postpartum hemorrhage signs and symptoms  Outcome: Ongoing  Note:   Vaginal bleeding WNL, no clots or foul odors. Problem: Infection - Risk of, Puerperal Infection:  Goal: Will show no infection signs and symptoms  Description  Will show no infection signs and symptoms  Outcome: Ongoing  Note:   Vital signs and assessments WNL. Problem: Mood - Altered:  Goal: Mood stable  Description  Mood stable  Outcome: Ongoing  Note:   Bonding with infant, participating in infant care. Problem: Pain:  Goal: Control of chronic pain  Description  Control of chronic pain  Outcome: Completed  Note:   N/A  Care plan reviewed with patient and she contributes to goal setting and voices understanding of plan of care.

## 2020-01-04 NOTE — PLAN OF CARE
Problem: Pain:  Goal: Pain level will decrease  Description  Pain level will decrease  1/4/2020 0938 by Efe Rosado RN  Outcome: Completed  Note:   Pain controlled with po meds. Discussed ice for  incisional pain or the use of warm blanket/heating pad for uterine cramps. Pt states her pain goal 4/10 has been met. Problem: Pain:  Goal: Control of acute pain  Description  Control of acute pain  1/4/2020 0938 by Efe Rosado RN  Outcome: Completed  Note:   Pain controlled with po meds. Discussed ice for  incisional pain or the use of warm blanket/heating pad for uterine cramps. Pt states her pain goal 4/10 has been met. Problem: Pain:  Goal: Control of chronic pain  Description  Control of chronic pain  1/4/2020 0133 by Phani Fuchs RN  Outcome: Completed  Note:   N/A     Problem: Discharge Planning:  Goal: Discharged to appropriate level of care  Description  Discharged to appropriate level of care  1/4/2020 0938 by Efe Rosado RN  Outcome: Completed  Note:   Home today       Problem: Constipation:  Goal: Bowel elimination is within specified parameters  Description  Bowel elimination is within specified parameters  1/4/2020 0938 by Efe Rosado RN  Outcome: Completed  Note:   Taking stool softeners and increasing fiber and fluid in diet. Ambulation encouraged. Had BM  yesterday     Problem: Fluid Volume - Imbalance:  Goal: Absence of postpartum hemorrhage signs and symptoms  Description  Absence of postpartum hemorrhage signs and symptoms  1/4/2020 0938 by Efe Rosado RN  Outcome: Completed  Note:   Vaginal bleeding WNL, no clots or foul odors. Hemoglobin 6.9. Problem: Infection - Risk of, Puerperal Infection:  Goal: Will show no infection signs and symptoms  Description  Will show no infection signs and symptoms  1/4/2020 0938 by Efe Rosado RN  Outcome: Completed  Note:   Vital signs and assessments WNL.        Problem: Mood - Altered:  Goal: Mood stable  Description  Mood stable  1/4/2020 0938 by Rory Arshad, RN  Outcome: Completed  Note:   Bonding with baby, participating in infant care. Care plan reviewed with patient and she contributes to goal setting and voices understanding of plan of care.

## 2023-02-16 ENCOUNTER — NURSE ONLY (OUTPATIENT)
Dept: LAB | Age: 33
End: 2023-02-16

## 2023-02-28 LAB — CYTOLOGY THIN PREP PAP: NORMAL

## 2023-05-26 ENCOUNTER — NURSE ONLY (OUTPATIENT)
Dept: LAB | Age: 33
End: 2023-05-26

## 2024-04-02 ENCOUNTER — HOSPITAL ENCOUNTER (EMERGENCY)
Age: 34
Discharge: HOME | End: 2024-04-02
Payer: COMMERCIAL

## 2024-04-02 VITALS
OXYGEN SATURATION: 100 % | HEIGHT: 62 IN | WEIGHT: 130 LBS | BODY MASS INDEX: 23.92 KG/M2 | SYSTOLIC BLOOD PRESSURE: 121 MMHG | HEART RATE: 84 BPM | TEMPERATURE: 98.1 F | RESPIRATION RATE: 16 BRPM | DIASTOLIC BLOOD PRESSURE: 74 MMHG

## 2024-04-02 DIAGNOSIS — N39.0 URINARY TRACT INFECTION WITHOUT HEMATURIA, SITE UNSPECIFIED: Primary | ICD-10-CM

## 2024-04-02 LAB
BILIRUB UR STRIP.AUTO-MCNC: NEGATIVE MG/DL
CHARACTER UR: CLEAR
COLOR: YELLOW
GLUCOSE UR QL STRIP.AUTO: NEGATIVE MG/DL
KETONES UR QL STRIP.AUTO: NEGATIVE
NITRITE UR QL STRIP.AUTO: NEGATIVE
PH UR STRIP.AUTO: 7 [PH] (ref 5–9)
PROT UR STRIP.AUTO-MCNC: NEGATIVE MG/DL
RBC #/AREA URNS HPF: NEGATIVE /[HPF]
SP GR UR STRIP.AUTO: 1.01 (ref 1–1.03)
UROBILINOGEN, URINE: 0.2 EU/DL (ref 0.2–1)
WBC #/AREA URNS HPF: ABNORMAL /[HPF]

## 2024-04-02 PROCEDURE — 87186 SC STD MICRODIL/AGAR DIL: CPT

## 2024-04-02 PROCEDURE — 99213 OFFICE O/P EST LOW 20 MIN: CPT | Performed by: EMERGENCY MEDICINE

## 2024-04-02 PROCEDURE — 87086 URINE CULTURE/COLONY COUNT: CPT

## 2024-04-02 PROCEDURE — 87077 CULTURE AEROBIC IDENTIFY: CPT

## 2024-04-02 PROCEDURE — 99203 OFFICE O/P NEW LOW 30 MIN: CPT

## 2024-04-02 PROCEDURE — 81003 URINALYSIS AUTO W/O SCOPE: CPT

## 2024-04-02 RX ORDER — PHENAZOPYRIDINE HYDROCHLORIDE 200 MG/1
200 TABLET, FILM COATED ORAL 3 TIMES DAILY PRN
Qty: 9 TABLET | Refills: 0 | Status: SHIPPED | OUTPATIENT
Start: 2024-04-02 | End: 2024-04-05

## 2024-04-02 RX ORDER — NITROFURANTOIN 25; 75 MG/1; MG/1
100 CAPSULE ORAL 2 TIMES DAILY
Qty: 10 CAPSULE | Refills: 0 | Status: SHIPPED | OUTPATIENT
Start: 2024-04-02 | End: 2024-04-07

## 2024-04-02 ASSESSMENT — ENCOUNTER SYMPTOMS
COUGH: 0
ABDOMINAL PAIN: 0
BACK PAIN: 0
SHORTNESS OF BREATH: 0

## 2024-04-02 ASSESSMENT — PAIN - FUNCTIONAL ASSESSMENT: PAIN_FUNCTIONAL_ASSESSMENT: NONE - DENIES PAIN

## 2024-04-02 NOTE — DISCHARGE INSTRUCTIONS
Macrobid as directed until gone    Pyridium as directed as needed for urinary discomfort    Drink at least 6 to 8 glasses of water equivalent per day    You may take Tylenol/ibuprofen as needed for discomfort    Follow-up with family physician or return if no significant improvement in 3 to 4 days.  Sooner if worse

## 2024-04-02 NOTE — ED PROVIDER NOTES
[facility] URGENT CARE  Urgent Care Encounter       CHIEF COMPLAINT       Chief Complaint   Patient presents with    Urinary Frequency       Nurses Notes reviewed and I agree except as noted in the HPI.  HISTORY OF PRESENT ILLNESS   Son Mcpherson is a 33 y.o. female who presents for complaints of urinary frequency and urgency and lower suprapubic abdominal discomfort.  Symptoms present x 4 to 5 days.  States she has increased her fluid intake to see if this would help with symptoms but they are not improving.  No fevers.  No blood in the urine.  Patient denies any concerns for pregnancy.  No vaginal discharge or abnormal bleeding.  No flank pain.    HPI    REVIEW OF SYSTEMS     Review of Systems   Constitutional:  Negative for activity change, fatigue and fever.   Respiratory:  Negative for cough and shortness of breath.    Gastrointestinal:  Negative for abdominal pain.   Genitourinary:  Positive for dysuria, frequency and urgency. Negative for pelvic pain, vaginal bleeding and vaginal discharge.   Musculoskeletal:  Negative for back pain.       PAST MEDICAL HISTORY   History reviewed. No pertinent past medical history.    SURGICALHISTORY     Patient  has no past surgical history on file.    CURRENT MEDICATIONS       Discharge Medication List as of 4/2/2024 10:01 AM          ALLERGIES     Patient is has No Known Allergies.    Patients   There is no immunization history on file for this patient.    FAMILY HISTORY     Patient's family history is not on file.    SOCIAL HISTORY     Patient  reports that she has been smoking cigarettes. She has never used smokeless tobacco. She reports that she does not drink alcohol and does not use drugs.    PHYSICAL EXAM     ED TRIAGE VITALS  BP: 121/74, Temp: 98.1 °F (36.7 °C), Pulse: 84, Respirations: 16, SpO2: 100 %,Estimated body mass index is 23.78 kg/m² as calculated from the following:    Height as of this encounter: 1.575 m (5' 2\").    Weight as of this

## 2024-04-03 LAB
BACTERIA UR CULT: ABNORMAL
ORGANISM: ABNORMAL

## 2024-05-09 ENCOUNTER — NURSE ONLY (OUTPATIENT)
Dept: LAB | Age: 34
End: 2024-05-09

## 2024-05-23 ENCOUNTER — HOSPITAL ENCOUNTER (EMERGENCY)
Age: 34
Discharge: HOME | End: 2024-05-23
Payer: COMMERCIAL

## 2024-05-23 VITALS
RESPIRATION RATE: 18 BRPM | TEMPERATURE: 97.7 F | BODY MASS INDEX: 23.92 KG/M2 | SYSTOLIC BLOOD PRESSURE: 126 MMHG | OXYGEN SATURATION: 100 % | WEIGHT: 130 LBS | HEART RATE: 92 BPM | HEIGHT: 62 IN | DIASTOLIC BLOOD PRESSURE: 82 MMHG

## 2024-05-23 DIAGNOSIS — S39.012A BACK STRAIN, INITIAL ENCOUNTER: Primary | ICD-10-CM

## 2024-05-23 DIAGNOSIS — M62.838 SPASM OF MUSCLE: ICD-10-CM

## 2024-05-23 LAB
BILIRUB UR STRIP.AUTO-MCNC: NEGATIVE MG/DL
CHARACTER UR: CLEAR
COLOR: YELLOW
CYTOLOGY THIN PREP PAP: NORMAL
GLUCOSE UR QL STRIP.AUTO: NEGATIVE MG/DL
KETONES UR QL STRIP.AUTO: NEGATIVE
NITRITE UR QL STRIP.AUTO: NEGATIVE
PH UR STRIP.AUTO: 7 [PH] (ref 5–9)
PROT UR STRIP.AUTO-MCNC: NEGATIVE MG/DL
RBC #/AREA URNS HPF: ABNORMAL /[HPF]
SP GR UR STRIP.AUTO: 1.01 (ref 1–1.03)
UROBILINOGEN, URINE: 0.2 EU/DL (ref 0.2–1)
WBC #/AREA URNS HPF: NEGATIVE /[HPF]

## 2024-05-23 PROCEDURE — 99213 OFFICE O/P EST LOW 20 MIN: CPT

## 2024-05-23 PROCEDURE — 81003 URINALYSIS AUTO W/O SCOPE: CPT

## 2024-05-23 RX ORDER — METHOCARBAMOL 750 MG/1
750 TABLET, FILM COATED ORAL 4 TIMES DAILY
Qty: 40 TABLET | Refills: 0 | Status: SHIPPED | OUTPATIENT
Start: 2024-05-23 | End: 2024-06-02

## 2024-05-23 ASSESSMENT — PAIN DESCRIPTION - DESCRIPTORS: DESCRIPTORS: DISCOMFORT

## 2024-05-23 ASSESSMENT — PAIN SCALES - GENERAL: PAINLEVEL_OUTOF10: 5

## 2024-05-23 ASSESSMENT — PAIN DESCRIPTION - ORIENTATION: ORIENTATION: MID

## 2024-05-23 ASSESSMENT — ENCOUNTER SYMPTOMS
EYE DISCHARGE: 0
BACK PAIN: 1
NAUSEA: 0
SHORTNESS OF BREATH: 0
COUGH: 0
TROUBLE SWALLOWING: 0
RHINORRHEA: 0
VOMITING: 0
DIARRHEA: 0
SORE THROAT: 0
EYE REDNESS: 0

## 2024-05-23 ASSESSMENT — PAIN - FUNCTIONAL ASSESSMENT: PAIN_FUNCTIONAL_ASSESSMENT: 0-10

## 2024-05-23 ASSESSMENT — PAIN DESCRIPTION - LOCATION: LOCATION: BACK

## 2024-05-23 NOTE — ED PROVIDER NOTES
[facility] URGENT CARE  Urgent Care Encounter      CHIEF COMPLAINT       Chief Complaint   Patient presents with    Back Pain     mid    Urinary Frequency       Nurses Notes reviewed and I agree except as noted in the HPI.  HISTORY OF PRESENT ILLNESS   Son Mcpherson is a 33 y.o. female who presents urgent care for evaluation of back pain and urinary frequency.  Onset of symptoms 4 days ago.  Denies any injury to back.  Reports she has been taking Tylenol ibuprofen with little benefit.  He reports yesterday at work she was brought to the use by the pain.  She reports she was placed on light duty for the rest of her shift.  She reports she has been googling and is concerned she might have a UTI.  She reports that her anxiety is making her symptoms worse.  Rates her pain 5 out of 10.  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    REVIEW OF SYSTEMS     Review of Systems   Constitutional:  Negative for chills, diaphoresis, fatigue and fever.   HENT:  Negative for congestion, ear pain, rhinorrhea, sore throat and trouble swallowing.    Eyes:  Negative for discharge and redness.   Respiratory:  Negative for cough and shortness of breath.    Cardiovascular:  Negative for chest pain.   Gastrointestinal:  Negative for diarrhea, nausea and vomiting.   Genitourinary:  Positive for flank pain and frequency. Negative for decreased urine volume.   Musculoskeletal:  Positive for back pain. Negative for neck pain and neck stiffness.   Skin:  Negative for rash.   Neurological:  Negative for headaches.   Hematological:  Negative for adenopathy.   Psychiatric/Behavioral:  Negative for sleep disturbance.        PAST MEDICAL HISTORY         Diagnosis Date    Anxiety     Mental disorder        SURGICAL HISTORY     Patient  has no past surgical history on file.    CURRENT MEDICATIONS       Discharge Medication List as of 5/23/2024  8:41

## 2024-05-23 NOTE — ED NOTES
Pt with complaints of mid back pain and urinary frequency that started 4 days ago. States Tylenol and ibuprofen have helped a little bit. Denies any injury.     Steffany Carbajal, CONCEPCIÓN  05/23/24 0854

## 2024-06-06 ENCOUNTER — HOSPITAL ENCOUNTER (EMERGENCY)
Age: 34
Discharge: HOME | End: 2024-06-06
Payer: COMMERCIAL

## 2024-06-06 VITALS
HEART RATE: 72 BPM | WEIGHT: 130 LBS | OXYGEN SATURATION: 98 % | BODY MASS INDEX: 23.92 KG/M2 | DIASTOLIC BLOOD PRESSURE: 73 MMHG | HEIGHT: 62 IN | TEMPERATURE: 98.1 F | SYSTOLIC BLOOD PRESSURE: 124 MMHG | RESPIRATION RATE: 16 BRPM

## 2024-06-06 DIAGNOSIS — T85.848A DENTAL IMPLANT PAIN, INITIAL ENCOUNTER: Primary | ICD-10-CM

## 2024-06-06 DIAGNOSIS — K04.7 DENTAL ABSCESS: ICD-10-CM

## 2024-06-06 PROCEDURE — 99214 OFFICE O/P EST MOD 30 MIN: CPT

## 2024-06-06 PROCEDURE — 99213 OFFICE O/P EST LOW 20 MIN: CPT

## 2024-06-06 RX ORDER — AMOXICILLIN AND CLAVULANATE POTASSIUM 875; 125 MG/1; MG/1
1 TABLET, FILM COATED ORAL 2 TIMES DAILY
Qty: 14 TABLET | Refills: 0 | Status: SHIPPED | OUTPATIENT
Start: 2024-06-06 | End: 2024-06-13

## 2024-06-06 RX ORDER — KETOROLAC TROMETHAMINE 10 MG/1
10 TABLET, FILM COATED ORAL EVERY 6 HOURS PRN
Qty: 20 TABLET | Refills: 0 | Status: SHIPPED | OUTPATIENT
Start: 2024-06-06

## 2024-06-06 RX ORDER — LIDOCAINE HYDROCHLORIDE 20 MG/ML
10 SOLUTION OROPHARYNGEAL PRN
Qty: 100 ML | Refills: 1 | Status: SHIPPED | OUTPATIENT
Start: 2024-06-06

## 2024-06-06 RX ORDER — TRAMADOL HYDROCHLORIDE 50 MG/1
50 TABLET ORAL EVERY 6 HOURS PRN
Qty: 12 TABLET | Refills: 0 | Status: SHIPPED | OUTPATIENT
Start: 2024-06-06 | End: 2024-06-09

## 2024-06-06 ASSESSMENT — PAIN DESCRIPTION - DESCRIPTORS: DESCRIPTORS: PRESSURE

## 2024-06-06 ASSESSMENT — PAIN DESCRIPTION - LOCATION: LOCATION: JAW

## 2024-06-06 ASSESSMENT — PAIN DESCRIPTION - PAIN TYPE: TYPE: ACUTE PAIN

## 2024-06-06 ASSESSMENT — PAIN DESCRIPTION - FREQUENCY: FREQUENCY: INTERMITTENT

## 2024-06-06 ASSESSMENT — PAIN SCALES - GENERAL: PAINLEVEL_OUTOF10: 5

## 2024-06-06 ASSESSMENT — PAIN DESCRIPTION - ONSET: ONSET: GRADUAL

## 2024-06-06 ASSESSMENT — PAIN - FUNCTIONAL ASSESSMENT
PAIN_FUNCTIONAL_ASSESSMENT: ACTIVITIES ARE NOT PREVENTED
PAIN_FUNCTIONAL_ASSESSMENT: 0-10

## 2024-06-06 ASSESSMENT — PAIN DESCRIPTION - ORIENTATION: ORIENTATION: LEFT;UPPER

## 2024-06-06 NOTE — DISCHARGE INSTRUCTIONS
Take antibiotics until they are gone even if you are feeling better.  Hydrate well keeping urine clear/pale yellow while you are taking these medications.    Please use Tylenol/Motrin for pain control.  If they are ineffective you are okay to use additional medications as prescribed.  If these are controlled substances please do not drive or operate machinery while taking them and use them responsibly/keep them safe.    If your symptoms fail to improve or overall worsen then please return to ER/urgent care or your family doctor.    If you still have a collection of fluid in your mouth and your pain is out of control please come to urgent care [place] on Monday, will perform incision and drainage if indicated at that time.    [address] [facility] [address]    These medications/treatments are not definitive treatment, you need to see a dentist for evaluation/definitive treatment.  Please attend your scheduled follow-up with your dentist.    If you are unable to obtain definitive dental care within the next few weeks please call [facility] [phone].  You are often able to get a same-day appointment per a dentist in training.    I hope you are feeling better soon!

## 2024-06-06 NOTE — ED PROVIDER NOTES
[facility] URGENT CARE      URGENT CARE     Pt Name: Son Mcpherson  MRN: [id number]  Birthdate 1990  Date of evaluation: 6/6/2024  Provider: MARISOL Leon - CNP    Urgent Care Encounter     CHIEF COMPLAINT       Chief Complaint   Patient presents with    Jaw Pain     Left, upper abscess     HISTORY OF PRESENT ILLNESS   Son Mcpherson is a 33 y.o. female who presents to urgent care with chief complaint of dental pain located left upper mouth on outside of teeth.  Denies history of the symptoms.  Admits to poor dentition with multiple decayed teeth.  \"My family all has small and sensitive teeth, have to use special toothpaste and everything.\"  Attempted to treat with ibuprofen without significant improvement.  Denies fever/chills.  Denies inability to tolerate food/drink.  Denies changes in bowel/bladder patterns.  Denies fevers.  Denies chest pains.  Denies shortness of breath or stridor.  Denies audible breathing.  Denies sensation of throat closing.  Denies trismus.  Denies clicking or TMJ.  Denies injury or trauma.    History obtained from patient  URGENT CARE TIMELINE      ED Course as of 06/06/24 0941   Thu Jun 06, 2024   0920 BP: 124/73  Vital signs are stable.  Afebrile [JR]   0940 /Hesitation.  I explained it would relieve the pressure and might improve her pain.  Additionally I stated that with antibiotics and the lack of performing incision and drainage antibiotics may not be enough to address her infection in its entirety.  I explained that persistent pain could be minimized by performing I/D today.  She declined stating she would rather just have antibiotics.  I explained antibiotics might not take the pain away.  I explained that if she has to have persistent pain or fever/chills she needs to come back to ER/urgent care for I&D.  I did share my work schedule with her stating IV at [place] urgent care on Monday if she were to have persistent pain.  Explained if things are

## 2024-06-12 NOTE — ED NOTES
Pt calls stating \" can't get into dentist and my paper work says to come back there to have it drained.  IS that something you do there\"  Reviewed discharge instruct in chart. Informed pt that she would need to see dentist  about having \" it drained\"     Margaux Maldonado, RN  06/12/24 7217

## 2024-07-10 ENCOUNTER — LAB (OUTPATIENT)
Dept: LAB | Age: 34
End: 2024-07-10

## 2024-08-07 ENCOUNTER — OFFICE VISIT (OUTPATIENT)
Dept: FAMILY MEDICINE CLINIC | Age: 34
End: 2024-08-07
Payer: COMMERCIAL

## 2024-08-07 VITALS
RESPIRATION RATE: 16 BRPM | WEIGHT: 136.2 LBS | OXYGEN SATURATION: 98 % | DIASTOLIC BLOOD PRESSURE: 60 MMHG | BODY MASS INDEX: 24.91 KG/M2 | TEMPERATURE: 98.6 F | SYSTOLIC BLOOD PRESSURE: 110 MMHG | HEART RATE: 96 BPM

## 2024-08-07 DIAGNOSIS — F43.29 ADJUSTMENT DISORDER WITH MIXED EMOTIONAL FEATURES: ICD-10-CM

## 2024-08-07 DIAGNOSIS — I10 ESSENTIAL HYPERTENSION: ICD-10-CM

## 2024-08-07 DIAGNOSIS — F51.01 PRIMARY INSOMNIA: ICD-10-CM

## 2024-08-07 DIAGNOSIS — Z00.00 ANNUAL PHYSICAL EXAM: Primary | ICD-10-CM

## 2024-08-07 DIAGNOSIS — F41.1 GAD (GENERALIZED ANXIETY DISORDER): ICD-10-CM

## 2024-08-07 DIAGNOSIS — Z00.8 ENCOUNTER FOR BIOMETRIC SCREENING: ICD-10-CM

## 2024-08-07 DIAGNOSIS — R03.0 ELEVATED BLOOD PRESSURE READING: ICD-10-CM

## 2024-08-07 LAB
25(OH)D3 SERPL-MCNC: 31 NG/ML (ref 30–100)
ALBUMIN SERPL BCG-MCNC: 4.9 G/DL (ref 3.5–5.1)
ALP SERPL-CCNC: 93 U/L (ref 38–126)
ALT SERPL W/O P-5'-P-CCNC: 100 U/L (ref 11–66)
ANION GAP SERPL CALC-SCNC: 14 MEQ/L (ref 8–16)
AST SERPL-CCNC: 66 U/L (ref 5–40)
BASOPHILS ABSOLUTE: 0.1 THOU/MM3 (ref 0–0.1)
BASOPHILS NFR BLD AUTO: 0.8 %
BILIRUB SERPL-MCNC: 0.6 MG/DL (ref 0.3–1.2)
BUN SERPL-MCNC: 8 MG/DL (ref 7–22)
CALCIUM SERPL-MCNC: 9.6 MG/DL (ref 8.5–10.5)
CHLORIDE SERPL-SCNC: 104 MEQ/L (ref 98–111)
CHOLEST SERPL-MCNC: 155 MG/DL (ref 100–199)
CO2 SERPL-SCNC: 24 MEQ/L (ref 23–33)
CREAT SERPL-MCNC: 0.5 MG/DL (ref 0.4–1.2)
DEPRECATED MEAN GLUCOSE BLD GHB EST-ACNC: 84 MG/DL (ref 70–126)
DEPRECATED RDW RBC AUTO: 42.8 FL (ref 35–45)
EOSINOPHIL NFR BLD AUTO: 2.2 %
EOSINOPHILS ABSOLUTE: 0.2 THOU/MM3 (ref 0–0.4)
ERYTHROCYTE [DISTWIDTH] IN BLOOD BY AUTOMATED COUNT: 12 % (ref 11.5–14.5)
GFR SERPL CREATININE-BSD FRML MDRD: > 90 ML/MIN/1.73M2
GLUCOSE SERPL-MCNC: 80 MG/DL (ref 70–108)
HBA1C MFR BLD HPLC: 4.8 % (ref 4.4–6.4)
HCT VFR BLD AUTO: 44.4 % (ref 37–47)
HDLC SERPL-MCNC: 38 MG/DL
HGB BLD-MCNC: 15.4 GM/DL (ref 12–16)
IMM GRANULOCYTES # BLD AUTO: 0.02 THOU/MM3 (ref 0–0.07)
IMM GRANULOCYTES NFR BLD AUTO: 0.2 %
LDLC SERPL CALC-MCNC: 99 MG/DL
LYMPHOCYTES ABSOLUTE: 2.4 THOU/MM3 (ref 1–4.8)
LYMPHOCYTES NFR BLD AUTO: 25.1 %
MCH RBC QN AUTO: 33.6 PG (ref 26–33)
MCHC RBC AUTO-ENTMCNC: 34.7 GM/DL (ref 32.2–35.5)
MCV RBC AUTO: 96.9 FL (ref 81–99)
MONOCYTES ABSOLUTE: 0.9 THOU/MM3 (ref 0.4–1.3)
MONOCYTES NFR BLD AUTO: 9.7 %
NEUTROPHILS ABSOLUTE: 6 THOU/MM3 (ref 1.8–7.7)
NEUTROPHILS NFR BLD AUTO: 62 %
NRBC BLD AUTO-RTO: 0 /100 WBC
PLATELET # BLD AUTO: 294 THOU/MM3 (ref 130–400)
PMV BLD AUTO: 10.5 FL (ref 9.4–12.4)
POTASSIUM SERPL-SCNC: 4.2 MEQ/L (ref 3.5–5.2)
PROT SERPL-MCNC: 8.5 G/DL (ref 6.1–8)
RBC # BLD AUTO: 4.58 MILL/MM3 (ref 4.2–5.4)
SODIUM SERPL-SCNC: 142 MEQ/L (ref 135–145)
TRIGL SERPL-MCNC: 89 MG/DL (ref 0–199)
TSH SERPL DL<=0.005 MIU/L-ACNC: 3.52 UIU/ML (ref 0.4–4.2)
WBC # BLD AUTO: 9.6 THOU/MM3 (ref 4.8–10.8)

## 2024-08-07 PROCEDURE — 99204 OFFICE O/P NEW MOD 45 MIN: CPT | Performed by: NURSE PRACTITIONER

## 2024-08-07 PROCEDURE — 99385 PREV VISIT NEW AGE 18-39: CPT | Performed by: NURSE PRACTITIONER

## 2024-08-07 PROCEDURE — 3078F DIAST BP <80 MM HG: CPT | Performed by: NURSE PRACTITIONER

## 2024-08-07 PROCEDURE — 3074F SYST BP LT 130 MM HG: CPT | Performed by: NURSE PRACTITIONER

## 2024-08-07 PROCEDURE — 36415 COLL VENOUS BLD VENIPUNCTURE: CPT | Performed by: NURSE PRACTITIONER

## 2024-08-07 RX ORDER — ESCITALOPRAM OXALATE 10 MG/1
10 TABLET ORAL DAILY
Qty: 90 TABLET | Refills: 1 | Status: SHIPPED | OUTPATIENT
Start: 2024-08-07

## 2024-08-07 RX ORDER — LANOLIN ALCOHOL/MO/W.PET/CERES
3 CREAM (GRAM) TOPICAL DAILY
COMMUNITY

## 2024-08-07 RX ORDER — MULTIVIT-MIN/FERROUS GLUCONATE 9 MG/15 ML
15 LIQUID (ML) ORAL DAILY
COMMUNITY

## 2024-08-07 SDOH — ECONOMIC STABILITY: HOUSING INSECURITY
IN THE LAST 12 MONTHS, WAS THERE A TIME WHEN YOU DID NOT HAVE A STEADY PLACE TO SLEEP OR SLEPT IN A SHELTER (INCLUDING NOW)?: NO

## 2024-08-07 SDOH — ECONOMIC STABILITY: FOOD INSECURITY: WITHIN THE PAST 12 MONTHS, YOU WORRIED THAT YOUR FOOD WOULD RUN OUT BEFORE YOU GOT MONEY TO BUY MORE.: NEVER TRUE

## 2024-08-07 SDOH — ECONOMIC STABILITY: INCOME INSECURITY: HOW HARD IS IT FOR YOU TO PAY FOR THE VERY BASICS LIKE FOOD, HOUSING, MEDICAL CARE, AND HEATING?: NOT HARD AT ALL

## 2024-08-07 SDOH — ECONOMIC STABILITY: FOOD INSECURITY: WITHIN THE PAST 12 MONTHS, THE FOOD YOU BOUGHT JUST DIDN'T LAST AND YOU DIDN'T HAVE MONEY TO GET MORE.: NEVER TRUE

## 2024-08-07 ASSESSMENT — ANXIETY QUESTIONNAIRES
1. FEELING NERVOUS, ANXIOUS, OR ON EDGE: MORE THAN HALF THE DAYS
5. BEING SO RESTLESS THAT IT IS HARD TO SIT STILL: MORE THAN HALF THE DAYS
7. FEELING AFRAID AS IF SOMETHING AWFUL MIGHT HAPPEN: MORE THAN HALF THE DAYS
IF YOU CHECKED OFF ANY PROBLEMS ON THIS QUESTIONNAIRE, HOW DIFFICULT HAVE THESE PROBLEMS MADE IT FOR YOU TO DO YOUR WORK, TAKE CARE OF THINGS AT HOME, OR GET ALONG WITH OTHER PEOPLE: SOMEWHAT DIFFICULT
3. WORRYING TOO MUCH ABOUT DIFFERENT THINGS: SEVERAL DAYS
GAD7 TOTAL SCORE: 14
4. TROUBLE RELAXING: MORE THAN HALF THE DAYS
2. NOT BEING ABLE TO STOP OR CONTROL WORRYING: NEARLY EVERY DAY
6. BECOMING EASILY ANNOYED OR IRRITABLE: MORE THAN HALF THE DAYS

## 2024-08-07 ASSESSMENT — PATIENT HEALTH QUESTIONNAIRE - PHQ9
7. TROUBLE CONCENTRATING ON THINGS, SUCH AS READING THE NEWSPAPER OR WATCHING TELEVISION: MORE THAN HALF THE DAYS
3. TROUBLE FALLING OR STAYING ASLEEP: SEVERAL DAYS
4. FEELING TIRED OR HAVING LITTLE ENERGY: MORE THAN HALF THE DAYS
1. LITTLE INTEREST OR PLEASURE IN DOING THINGS: MORE THAN HALF THE DAYS
SUM OF ALL RESPONSES TO PHQ9 QUESTIONS 1 & 2: 4
2. FEELING DOWN, DEPRESSED OR HOPELESS: MORE THAN HALF THE DAYS
6. FEELING BAD ABOUT YOURSELF - OR THAT YOU ARE A FAILURE OR HAVE LET YOURSELF OR YOUR FAMILY DOWN: SEVERAL DAYS
5. POOR APPETITE OR OVEREATING: MORE THAN HALF THE DAYS
10. IF YOU CHECKED OFF ANY PROBLEMS, HOW DIFFICULT HAVE THESE PROBLEMS MADE IT FOR YOU TO DO YOUR WORK, TAKE CARE OF THINGS AT HOME, OR GET ALONG WITH OTHER PEOPLE: SOMEWHAT DIFFICULT
SUM OF ALL RESPONSES TO PHQ QUESTIONS 1-9: 13
SUM OF ALL RESPONSES TO PHQ QUESTIONS 1-9: 13
8. MOVING OR SPEAKING SO SLOWLY THAT OTHER PEOPLE COULD HAVE NOTICED. OR THE OPPOSITE, BEING SO FIGETY OR RESTLESS THAT YOU HAVE BEEN MOVING AROUND A LOT MORE THAN USUAL: SEVERAL DAYS
SUM OF ALL RESPONSES TO PHQ QUESTIONS 1-9: 13
SUM OF ALL RESPONSES TO PHQ QUESTIONS 1-9: 13
9. THOUGHTS THAT YOU WOULD BE BETTER OFF DEAD, OR OF HURTING YOURSELF: NOT AT ALL

## 2024-08-07 ASSESSMENT — ENCOUNTER SYMPTOMS
CONSTIPATION: 0
ABDOMINAL PAIN: 0
EYE DISCHARGE: 0
VOMITING: 0
SHORTNESS OF BREATH: 0
RHINORRHEA: 0
CHEST TIGHTNESS: 0
COUGH: 0
DIARRHEA: 0
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
## 2024-08-08 DIAGNOSIS — R74.8 ELEVATED LIVER ENZYMES: Primary | ICD-10-CM

## 2024-08-22 NOTE — PROGRESS NOTES
Health Maintenance Due   Topic Date Due    Pneumococcal 0-64 years Vaccine (1 of 2 - PCV) Never done    Varicella vaccine (1 of 2 - 13+ 2-dose series) Never done    Hepatitis C screen  Never done    Hepatitis B vaccine (1 of 3 - 19+ 3-dose series) Never done    COVID-19 Vaccine (1 - 2023-24 season) Never done    Flu vaccine (1) Never done

## 2024-08-26 ENCOUNTER — OFFICE VISIT (OUTPATIENT)
Dept: FAMILY MEDICINE CLINIC | Age: 34
End: 2024-08-26
Payer: COMMERCIAL

## 2024-08-26 VITALS
BODY MASS INDEX: 24.66 KG/M2 | HEART RATE: 94 BPM | TEMPERATURE: 97.3 F | WEIGHT: 134 LBS | OXYGEN SATURATION: 97 % | DIASTOLIC BLOOD PRESSURE: 60 MMHG | SYSTOLIC BLOOD PRESSURE: 118 MMHG | HEIGHT: 62 IN

## 2024-08-26 DIAGNOSIS — R74.8 ELEVATED LIVER ENZYMES: Primary | ICD-10-CM

## 2024-08-26 DIAGNOSIS — F41.1 GAD (GENERALIZED ANXIETY DISORDER): ICD-10-CM

## 2024-08-26 DIAGNOSIS — F43.29 ADJUSTMENT DISORDER WITH MIXED EMOTIONAL FEATURES: ICD-10-CM

## 2024-08-26 DIAGNOSIS — F51.01 PRIMARY INSOMNIA: ICD-10-CM

## 2024-08-26 PROCEDURE — 3074F SYST BP LT 130 MM HG: CPT | Performed by: NURSE PRACTITIONER

## 2024-08-26 PROCEDURE — 3078F DIAST BP <80 MM HG: CPT | Performed by: NURSE PRACTITIONER

## 2024-08-26 PROCEDURE — 99214 OFFICE O/P EST MOD 30 MIN: CPT | Performed by: NURSE PRACTITIONER

## 2024-08-26 RX ORDER — ESCITALOPRAM OXALATE 20 MG/1
20 TABLET ORAL DAILY
Qty: 90 TABLET | Refills: 0 | Status: SHIPPED | OUTPATIENT
Start: 2024-08-26

## 2024-08-26 SDOH — ECONOMIC STABILITY: INCOME INSECURITY: HOW HARD IS IT FOR YOU TO PAY FOR THE VERY BASICS LIKE FOOD, HOUSING, MEDICAL CARE, AND HEATING?: NOT VERY HARD

## 2024-08-26 SDOH — ECONOMIC STABILITY: FOOD INSECURITY: WITHIN THE PAST 12 MONTHS, THE FOOD YOU BOUGHT JUST DIDN'T LAST AND YOU DIDN'T HAVE MONEY TO GET MORE.: NEVER TRUE

## 2024-08-26 SDOH — ECONOMIC STABILITY: FOOD INSECURITY: WITHIN THE PAST 12 MONTHS, YOU WORRIED THAT YOUR FOOD WOULD RUN OUT BEFORE YOU GOT MONEY TO BUY MORE.: NEVER TRUE

## 2024-08-26 ASSESSMENT — ENCOUNTER SYMPTOMS
RHINORRHEA: 0
VOMITING: 0
ABDOMINAL PAIN: 0
SHORTNESS OF BREATH: 0
DIARRHEA: 0
CONSTIPATION: 0
COUGH: 0
EYE DISCHARGE: 0
CHEST TIGHTNESS: 0

## 2024-08-26 ASSESSMENT — PATIENT HEALTH QUESTIONNAIRE - PHQ9
3. TROUBLE FALLING OR STAYING ASLEEP: NOT AT ALL
SUM OF ALL RESPONSES TO PHQ9 QUESTIONS 1 & 2: 1
8. MOVING OR SPEAKING SO SLOWLY THAT OTHER PEOPLE COULD HAVE NOTICED. OR THE OPPOSITE, BEING SO FIGETY OR RESTLESS THAT YOU HAVE BEEN MOVING AROUND A LOT MORE THAN USUAL: NOT AT ALL
SUM OF ALL RESPONSES TO PHQ QUESTIONS 1-9: 2
10. IF YOU CHECKED OFF ANY PROBLEMS, HOW DIFFICULT HAVE THESE PROBLEMS MADE IT FOR YOU TO DO YOUR WORK, TAKE CARE OF THINGS AT HOME, OR GET ALONG WITH OTHER PEOPLE: NOT DIFFICULT AT ALL
SUM OF ALL RESPONSES TO PHQ QUESTIONS 1-9: 2
SUM OF ALL RESPONSES TO PHQ QUESTIONS 1-9: 2
9. THOUGHTS THAT YOU WOULD BE BETTER OFF DEAD, OR OF HURTING YOURSELF: NOT AT ALL
6. FEELING BAD ABOUT YOURSELF - OR THAT YOU ARE A FAILURE OR HAVE LET YOURSELF OR YOUR FAMILY DOWN: NOT AT ALL
2. FEELING DOWN, DEPRESSED OR HOPELESS: SEVERAL DAYS
SUM OF ALL RESPONSES TO PHQ QUESTIONS 1-9: 2
4. FEELING TIRED OR HAVING LITTLE ENERGY: NOT AT ALL
5. POOR APPETITE OR OVEREATING: NOT AT ALL
7. TROUBLE CONCENTRATING ON THINGS, SUCH AS READING THE NEWSPAPER OR WATCHING TELEVISION: SEVERAL DAYS
1. LITTLE INTEREST OR PLEASURE IN DOING THINGS: NOT AT ALL

## 2024-08-26 NOTE — ASSESSMENT & PLAN NOTE
Well-controlled, continue current medications    Orders:    escitalopram (LEXAPRO) 20 MG tablet; Take 1 tablet by mouth daily

## 2024-08-26 NOTE — PROGRESS NOTES
.   [facility] [place] [facility]  [address] [place] [address].  [place] [address]  Dept: [phone]  Dept Fax: [phone]    Visit type: Established patient    Reason for Visit: Discuss Labs (Elevated liver enzymes ), FMLA (Would like to talk about paperwork), and Discuss Medications (Lexapro is helping, is able to sleep during the night. Notes to be having a bm every morning. )      Assessment & Plan   Assessment and Plan       Assessment & Plan  Elevated liver enzymes    Need to completed liver US          Adjustment disorder with mixed emotional features    Improved but not controlled- increase lexapro    Orders:  •  escitalopram (LEXAPRO) 20 MG tablet; Take 1 tablet by mouth daily    Primary insomnia   Well-controlled, continue current medications    Orders:  •  escitalopram (LEXAPRO) 20 MG tablet; Take 1 tablet by mouth daily    ASIM (generalized anxiety disorder)    Not at goal- increase lexapro     Orders:  •  escitalopram (LEXAPRO) 20 MG tablet; Take 1 tablet by mouth daily      Return in about 6 weeks (around 10/7/2024) for mental health re eval- review US results .       Subjective       Elevated liver enzymes. Has been clean since 2017      Asim, adjusment disorder, insomnia. She started lexapro and can tell a big improvement. Ilya start school next week and is emotional over this. Worries over his son. Mind is still racing.        Review of Systems   Constitutional:  Negative for activity change, appetite change and fever.   HENT:  Negative for congestion, ear pain and rhinorrhea.    Eyes:  Negative for discharge and visual disturbance.   Respiratory:  Negative for cough, chest tightness and shortness of breath.    Cardiovascular:  Negative for chest pain and palpitations.   Gastrointestinal:  Negative for abdominal pain, constipation, diarrhea and vomiting.   Genitourinary:  Negative for difficulty urinating and hematuria.   Musculoskeletal:  Negative for arthralgias and myalgias.   Skin:  Negative for rash.

## 2024-09-12 ENCOUNTER — HOSPITAL ENCOUNTER (OUTPATIENT)
Dept: ULTRASOUND IMAGING | Age: 34
Discharge: HOME | End: 2024-09-12
Payer: COMMERCIAL

## 2024-09-12 DIAGNOSIS — R74.8 ELEVATED LIVER ENZYMES: ICD-10-CM

## 2024-09-12 PROCEDURE — 76705 ECHO EXAM OF ABDOMEN: CPT

## 2024-09-15 ENCOUNTER — PATIENT MESSAGE (OUTPATIENT)
Dept: FAMILY MEDICINE CLINIC | Age: 34
End: 2024-09-15

## 2024-09-24 ENCOUNTER — PATIENT MESSAGE (OUTPATIENT)
Dept: FAMILY MEDICINE CLINIC | Age: 34
End: 2024-09-24

## 2024-10-01 NOTE — DISCHARGE INSTRUCTIONS
Prescribed methocarbamol 750 mg 4 times daily for 10 days for back pain.  Recommend rotating Tylenol and ibuprofen every 6 hours as needed for pain control.  Ice.  May also apply topical Biofreeze, IcyHot, or other muscle rub.  Follow-up with your primary care provider or [facility] in 5 to 7 days if symptoms worsen or fail to improve.  Seek care if worsening of symptoms.   Pt got bit by a stray dog two days ago to the right pinky finger. Pt states he washed the site with soap and water.  Wound noted to be healing well with no swelling, warmth or tenderness. Pt states he needs a rabies shot. Denies a medical history and use of medications.

## 2024-10-09 NOTE — PROGRESS NOTES
Health Maintenance Due   Topic Date Due    Pneumococcal 0-64 years Vaccine (1 of 2 - PCV) Never done    Varicella vaccine (1 of 2 - 13+ 2-dose series) Never done    Hepatitis C screen  Never done    Hepatitis B vaccine (1 of 3 - 19+ 3-dose series) Never done    Flu vaccine (1) Never done    COVID-19 Vaccine (1 - 2023-24 season) Never done        normal...

## 2024-10-10 ENCOUNTER — OFFICE VISIT (OUTPATIENT)
Dept: FAMILY MEDICINE CLINIC | Age: 34
End: 2024-10-10
Payer: COMMERCIAL

## 2024-10-10 VITALS
TEMPERATURE: 97.2 F | BODY MASS INDEX: 24.9 KG/M2 | WEIGHT: 136.2 LBS | HEART RATE: 84 BPM | OXYGEN SATURATION: 94 % | SYSTOLIC BLOOD PRESSURE: 118 MMHG | RESPIRATION RATE: 16 BRPM | DIASTOLIC BLOOD PRESSURE: 67 MMHG

## 2024-10-10 DIAGNOSIS — K21.9 GASTROESOPHAGEAL REFLUX DISEASE, UNSPECIFIED WHETHER ESOPHAGITIS PRESENT: ICD-10-CM

## 2024-10-10 DIAGNOSIS — F41.1 GAD (GENERALIZED ANXIETY DISORDER): ICD-10-CM

## 2024-10-10 DIAGNOSIS — F51.01 PRIMARY INSOMNIA: ICD-10-CM

## 2024-10-10 DIAGNOSIS — F43.29 ADJUSTMENT DISORDER WITH MIXED EMOTIONAL FEATURES: ICD-10-CM

## 2024-10-10 DIAGNOSIS — K80.20 GALL STONES: Primary | ICD-10-CM

## 2024-10-10 PROCEDURE — 99214 OFFICE O/P EST MOD 30 MIN: CPT | Performed by: NURSE PRACTITIONER

## 2024-10-10 PROCEDURE — 3078F DIAST BP <80 MM HG: CPT | Performed by: NURSE PRACTITIONER

## 2024-10-10 PROCEDURE — 3074F SYST BP LT 130 MM HG: CPT | Performed by: NURSE PRACTITIONER

## 2024-10-10 RX ORDER — ESOMEPRAZOLE MAGNESIUM 20 MG/1
20 GRANULE, DELAYED RELEASE ORAL DAILY
COMMUNITY

## 2024-10-10 RX ORDER — ESCITALOPRAM OXALATE 20 MG/1
20 TABLET ORAL DAILY
Qty: 90 TABLET | Refills: 1 | Status: SHIPPED | OUTPATIENT
Start: 2024-10-10

## 2024-10-10 ASSESSMENT — ANXIETY QUESTIONNAIRES
3. WORRYING TOO MUCH ABOUT DIFFERENT THINGS: NOT AT ALL
GAD7 TOTAL SCORE: 2
2. NOT BEING ABLE TO STOP OR CONTROL WORRYING: NOT AT ALL
6. BECOMING EASILY ANNOYED OR IRRITABLE: NOT AT ALL
4. TROUBLE RELAXING: SEVERAL DAYS
7. FEELING AFRAID AS IF SOMETHING AWFUL MIGHT HAPPEN: NOT AT ALL
1. FEELING NERVOUS, ANXIOUS, OR ON EDGE: SEVERAL DAYS
5. BEING SO RESTLESS THAT IT IS HARD TO SIT STILL: NOT AT ALL

## 2024-10-10 ASSESSMENT — ENCOUNTER SYMPTOMS
DIARRHEA: 0
SHORTNESS OF BREATH: 0
EYE DISCHARGE: 0
CONSTIPATION: 0
VOMITING: 0
COUGH: 0
CHEST TIGHTNESS: 0
RHINORRHEA: 0
ABDOMINAL PAIN: 0

## 2024-10-10 NOTE — PROGRESS NOTES
.   [facility]  [address] [place] [id number]  Dept: [phone]  Dept Fax: [phone]    Visit type: Established patient    Reason for Visit: Mental Health Problem (6 week follow up ), Results (Ultrasound results /), and Health Maintenance (See note /)      Assessment & Plan   Assessment and Plan       Assessment & Plan  Gall stones            Gastroesophageal reflux disease, unspecified whether esophagitis present            ASIM (generalized anxiety disorder)       Orders:    escitalopram (LEXAPRO) 20 MG tablet; Take 1 tablet by mouth daily    Adjustment disorder with mixed emotional features       Orders:    escitalopram (LEXAPRO) 20 MG tablet; Take 1 tablet by mouth daily    Primary insomnia       Orders:    escitalopram (LEXAPRO) 20 MG tablet; Take 1 tablet by mouth daily    Anxiety has improved, ASIM scale is at 2-will continue Lexapro and consider adjustment potentially if needed in the next couple months  Declines flu vaccination  Ultrasound of gallbladder reviewed with patient  Heartburn and GI symptoms have resolved with treatment of anxiety and starting Nexium-can continue Nexium over-the-counter    Return in about 2 months (around 12/10/2024) for fu mental health .       Subjective       Here for chronic issue follow-up and review of diagnostic testing      Nexium- resolved heartburn  Would like to review US of gall bladder    ASIM, adjustment order  Was started lexapro  Is taking melatonin   Anxious and trouble relaxing once or twice a week   Elimating negativigy   Setting boundries  Encouraging signifant other to get help  Good support at work- at [employer] for 6 years  Does get aggravated to see sig other drink ETOH  Is more patient with her son         Review of Systems   Constitutional:  Negative for activity change, appetite change and fever.   HENT:  Negative for congestion, ear pain and rhinorrhea.    Eyes:  Negative for discharge and visual disturbance.

## 2024-10-10 NOTE — ASSESSMENT & PLAN NOTE
Orders:    escitalopram (LEXAPRO) 20 MG tablet; Take 1 tablet by mouth daily    Anxiety has improved, ASIM scale is at 2-will continue Lexapro and consider adjustment potentially if needed in the next couple months  Declines flu vaccination  Ultrasound of gallbladder reviewed with patient  Heartburn and GI symptoms have resolved with treatment of anxiety and starting Nexium-can continue Nexium over-the-counter

## 2024-12-19 ENCOUNTER — OFFICE VISIT (OUTPATIENT)
Dept: FAMILY MEDICINE CLINIC | Age: 34
End: 2024-12-19

## 2024-12-19 VITALS
DIASTOLIC BLOOD PRESSURE: 60 MMHG | HEART RATE: 74 BPM | SYSTOLIC BLOOD PRESSURE: 90 MMHG | HEIGHT: 62 IN | BODY MASS INDEX: 25.98 KG/M2 | OXYGEN SATURATION: 99 % | TEMPERATURE: 97.7 F | RESPIRATION RATE: 18 BRPM | WEIGHT: 141.2 LBS

## 2024-12-19 DIAGNOSIS — R63.5 WEIGHT GAIN: Primary | ICD-10-CM

## 2024-12-19 DIAGNOSIS — F43.29 ADJUSTMENT DISORDER WITH MIXED EMOTIONAL FEATURES: ICD-10-CM

## 2024-12-19 DIAGNOSIS — R53.83 FATIGUE, UNSPECIFIED TYPE: ICD-10-CM

## 2024-12-19 DIAGNOSIS — F41.1 GAD (GENERALIZED ANXIETY DISORDER): ICD-10-CM

## 2024-12-19 DIAGNOSIS — F51.01 PRIMARY INSOMNIA: ICD-10-CM

## 2024-12-19 DIAGNOSIS — J20.8 ACUTE BRONCHITIS DUE TO OTHER SPECIFIED ORGANISMS: ICD-10-CM

## 2024-12-19 DIAGNOSIS — R06.2 WHEEZING: ICD-10-CM

## 2024-12-19 RX ORDER — BROMPHENIRAMINE MALEATE, PSEUDOEPHEDRINE HYDROCHLORIDE, AND DEXTROMETHORPHAN HYDROBROMIDE 2; 30; 10 MG/5ML; MG/5ML; MG/5ML
5 SYRUP ORAL 4 TIMES DAILY PRN
Qty: 120 ML | Refills: 0 | Status: SHIPPED | OUTPATIENT
Start: 2024-12-19

## 2024-12-19 RX ORDER — ESCITALOPRAM OXALATE 20 MG/1
10 TABLET ORAL DAILY
Qty: 90 TABLET | Refills: 1 | Status: SHIPPED | OUTPATIENT
Start: 2024-12-19

## 2024-12-19 RX ORDER — METHYLPREDNISOLONE 4 MG/1
TABLET ORAL
Qty: 1 KIT | Refills: 0 | Status: SHIPPED | OUTPATIENT
Start: 2024-12-19 | End: 2024-12-25

## 2024-12-19 RX ORDER — ALBUTEROL SULFATE 90 UG/1
2 INHALANT RESPIRATORY (INHALATION) 4 TIMES DAILY PRN
Qty: 18 G | Refills: 0 | Status: SHIPPED | OUTPATIENT
Start: 2024-12-19

## 2024-12-19 ASSESSMENT — ENCOUNTER SYMPTOMS
SHORTNESS OF BREATH: 0
EYE DISCHARGE: 0
VOMITING: 0
CONSTIPATION: 0
CHEST TIGHTNESS: 1
RHINORRHEA: 0
DIARRHEA: 0
SORE THROAT: 1
COUGH: 1
ABDOMINAL PAIN: 0

## 2024-12-19 NOTE — PROGRESS NOTES
Health Maintenance Due   Topic Date Due    Pneumococcal 0-64 years Vaccine (1 of 2 - PCV) Never done    Varicella vaccine (1 of 2 - 13+ 2-dose series) Never done    Hepatitis C screen  Never done    Hepatitis B vaccine (1 of 3 - 19+ 3-dose series) Never done    COVID-19 Vaccine (1 - 2023-24 season) Never done       
  Substance Use Topics    Alcohol use: Never        No past surgical history on file.    No family history on file.    Objective       BP 90/60 (Site: Left Upper Arm, Position: Sitting)   Pulse 74   Temp 97.7 °F (36.5 °C) (Temporal)   Resp 18   Ht 1.575 m (5' 2.01\")   Wt 64 kg (141 lb 3.2 oz)   SpO2 99%   BMI 25.82 kg/m²   Physical Exam  Vitals reviewed.   Constitutional:       Appearance: She is well-developed.   HENT:      Head: Normocephalic and atraumatic.      Right Ear: Tympanic membrane and external ear normal.      Left Ear: Tympanic membrane and external ear normal.      Nose: Congestion present.      Right Sinus: No maxillary sinus tenderness or frontal sinus tenderness.      Left Sinus: No maxillary sinus tenderness or frontal sinus tenderness.      Mouth/Throat:      Pharynx: Oropharynx is clear. Uvula midline.      Tonsils: No tonsillar exudate or tonsillar abscesses.   Eyes:      Conjunctiva/sclera: Conjunctivae normal.   Neck:      Thyroid: No thyromegaly.      Trachea: Trachea normal.   Cardiovascular:      Rate and Rhythm: Normal rate and regular rhythm.      Heart sounds: Normal heart sounds. No murmur heard.     No friction rub. No gallop.   Pulmonary:      Effort: Pulmonary effort is normal.      Breath sounds: Normal breath sounds. No wheezing.   Abdominal:      General: Bowel sounds are normal.      Palpations: Abdomen is soft.      Tenderness: There is no abdominal tenderness.   Musculoskeletal:         General: Normal range of motion.      Cervical back: Normal range of motion and neck supple. No spinous process tenderness.   Skin:     General: Skin is warm and dry.      Findings: No erythema or rash.   Neurological:      Mental Status: She is alert and oriented to person, place, and time.   Psychiatric:         Speech: Speech normal.         Behavior: Behavior normal.         Thought Content: Thought content normal.           Data Reviewed and Summarized       Labs:

## 2025-01-06 ENCOUNTER — TELEPHONE (OUTPATIENT)
Dept: FAMILY MEDICINE CLINIC | Age: 35
End: 2025-01-06

## 2025-01-06 DIAGNOSIS — B96.89 ACUTE BACTERIAL SINUSITIS: Primary | ICD-10-CM

## 2025-01-06 DIAGNOSIS — J01.90 ACUTE BACTERIAL SINUSITIS: Primary | ICD-10-CM

## 2025-01-06 NOTE — TELEPHONE ENCOUNTER
Patient notes she has been sick, notes she is very congested. States it has been ongoing for two weeks and has been taking mucinex . Wants to know if you can send in an antibiotic to clear it up.     Please advise    Beto estrella

## 2025-01-10 DIAGNOSIS — R06.2 WHEEZING: ICD-10-CM

## 2025-01-13 RX ORDER — ALBUTEROL SULFATE 90 UG/1
INHALANT RESPIRATORY (INHALATION)
Qty: 6.7 G | Refills: 5 | Status: SHIPPED | OUTPATIENT
Start: 2025-01-13

## 2025-01-27 ENCOUNTER — OFFICE VISIT (OUTPATIENT)
Dept: FAMILY MEDICINE CLINIC | Age: 35
End: 2025-01-27

## 2025-01-27 VITALS
DIASTOLIC BLOOD PRESSURE: 72 MMHG | SYSTOLIC BLOOD PRESSURE: 112 MMHG | WEIGHT: 140.4 LBS | HEART RATE: 100 BPM | RESPIRATION RATE: 16 BRPM | HEIGHT: 62 IN | TEMPERATURE: 97.4 F | OXYGEN SATURATION: 98 % | BODY MASS INDEX: 25.83 KG/M2

## 2025-01-27 DIAGNOSIS — F43.29 ADJUSTMENT DISORDER WITH MIXED EMOTIONAL FEATURES: ICD-10-CM

## 2025-01-27 DIAGNOSIS — F41.1 GAD (GENERALIZED ANXIETY DISORDER): Primary | ICD-10-CM

## 2025-01-27 DIAGNOSIS — F51.01 PRIMARY INSOMNIA: ICD-10-CM

## 2025-01-27 RX ORDER — DESVENLAFAXINE 25 MG/1
25 TABLET, EXTENDED RELEASE ORAL DAILY
Qty: 30 TABLET | Refills: 2 | Status: SHIPPED | OUTPATIENT
Start: 2025-01-27

## 2025-01-27 SDOH — ECONOMIC STABILITY: FOOD INSECURITY: WITHIN THE PAST 12 MONTHS, YOU WORRIED THAT YOUR FOOD WOULD RUN OUT BEFORE YOU GOT MONEY TO BUY MORE.: NEVER TRUE

## 2025-01-27 SDOH — ECONOMIC STABILITY: FOOD INSECURITY: WITHIN THE PAST 12 MONTHS, THE FOOD YOU BOUGHT JUST DIDN'T LAST AND YOU DIDN'T HAVE MONEY TO GET MORE.: NEVER TRUE

## 2025-01-27 ASSESSMENT — PATIENT HEALTH QUESTIONNAIRE - PHQ9
2. FEELING DOWN, DEPRESSED OR HOPELESS: SEVERAL DAYS
SUM OF ALL RESPONSES TO PHQ9 QUESTIONS 1 & 2: 2
SUM OF ALL RESPONSES TO PHQ QUESTIONS 1-9: 2
1. LITTLE INTEREST OR PLEASURE IN DOING THINGS: SEVERAL DAYS
SUM OF ALL RESPONSES TO PHQ QUESTIONS 1-9: 2

## 2025-01-27 ASSESSMENT — ENCOUNTER SYMPTOMS
EYE DISCHARGE: 0
CHEST TIGHTNESS: 0
ABDOMINAL PAIN: 0
DIARRHEA: 0
SHORTNESS OF BREATH: 0
CONSTIPATION: 0
COUGH: 0
VOMITING: 0
RHINORRHEA: 0

## 2025-01-27 NOTE — PROGRESS NOTES
Health Maintenance Due   Topic Date Due    Pneumococcal 0-64 years Vaccine (1 of 2 - PCV) Never done    Varicella vaccine (1 of 2 - 13+ 2-dose series) Never done    Hepatitis C screen  Never done    Hepatitis B vaccine (1 of 3 - 19+ 3-dose series) Never done    COVID-19 Vaccine (1 - 2023-24 season) Never done

## 2025-01-27 NOTE — PROGRESS NOTES
Son Mcpherson (:  1990) is a 34 y.o. female, Established patient, here for evaluation of the following chief complaint(s):  Mental Health Problem (Concerned about the weight gain, has a hard time focusing on things. During the holidays she was talking to her sister and thought about getting test or speaking to provider regarding ), Health Maintenance (See note), and Anxiety (Has done a lot better )         Assessment & Plan  1. Depression.  Her depression screening did not yield positive results. She has been on Lexapro 20 mg, which she has been breaking in half as instructed. Although it has helped with fatigue, she has noticed weight gain and difficulty focusing. She did not tolerate the increased dose of Lexapro. She will discontinue Lexapro and start Pristiq 25 mg. Potential side effects, including dull headaches, nausea, diarrhea, and constipation, were discussed. She can continue taking melatonin at night for better sleep. If Pristiq causes jitteriness, she should take it in the morning; if it causes drowsiness, she should take it in the evening. The prescription for Pristiq will be sent electronically to [facility]     Follow-up  The patient will follow up in 4 weeks.    Results    1. ASIM (generalized anxiety disorder)  -     desvenlafaxine succinate (PRISTIQ) 25 MG TB24 extended release tablet; Take 1 tablet by mouth daily, Disp-30 tablet, R-2Normal  2. Adjustment disorder with mixed emotional features  -     desvenlafaxine succinate (PRISTIQ) 25 MG TB24 extended release tablet; Take 1 tablet by mouth daily, Disp-30 tablet, R-2Normal  3. Primary insomnia  4. BMI 25.0-25.9,adult    Return in about 29 days (around 2025) for  mental health .       Subjective   History of Present Illness  The patient presents for evaluation of depression.    She has been adhering to a regimen of Lexapro 20 mg, which she has been halving as per previous instructions. This medication has been effective in

## 2025-02-03 RX ORDER — ESCITALOPRAM OXALATE 20 MG/1
20 TABLET ORAL DAILY
Qty: 90 TABLET | Refills: 1 | Status: SHIPPED | OUTPATIENT
Start: 2025-02-03

## 2025-02-28 DIAGNOSIS — F41.1 GAD (GENERALIZED ANXIETY DISORDER): ICD-10-CM

## 2025-02-28 DIAGNOSIS — F43.29 ADJUSTMENT DISORDER WITH MIXED EMOTIONAL FEATURES: ICD-10-CM

## 2025-03-03 RX ORDER — DESVENLAFAXINE 25 MG/1
25 TABLET, EXTENDED RELEASE ORAL DAILY
Qty: 30 TABLET | Refills: 2 | Status: SHIPPED | OUTPATIENT
Start: 2025-03-03 | End: 2025-03-05 | Stop reason: SDUPTHER

## 2025-03-05 ENCOUNTER — OFFICE VISIT (OUTPATIENT)
Dept: FAMILY MEDICINE CLINIC | Age: 35
End: 2025-03-05

## 2025-03-05 VITALS
HEART RATE: 70 BPM | BODY MASS INDEX: 25.36 KG/M2 | SYSTOLIC BLOOD PRESSURE: 110 MMHG | DIASTOLIC BLOOD PRESSURE: 70 MMHG | TEMPERATURE: 97.9 F | RESPIRATION RATE: 16 BRPM | WEIGHT: 137.8 LBS | OXYGEN SATURATION: 97 % | HEIGHT: 62 IN

## 2025-03-05 DIAGNOSIS — I10 ESSENTIAL HYPERTENSION: ICD-10-CM

## 2025-03-05 DIAGNOSIS — K21.9 GASTROESOPHAGEAL REFLUX DISEASE, UNSPECIFIED WHETHER ESOPHAGITIS PRESENT: ICD-10-CM

## 2025-03-05 DIAGNOSIS — F43.29 ADJUSTMENT DISORDER WITH MIXED EMOTIONAL FEATURES: Primary | ICD-10-CM

## 2025-03-05 DIAGNOSIS — F41.1 GAD (GENERALIZED ANXIETY DISORDER): ICD-10-CM

## 2025-03-05 DIAGNOSIS — F51.01 PRIMARY INSOMNIA: ICD-10-CM

## 2025-03-05 RX ORDER — DESVENLAFAXINE 50 MG/1
50 TABLET, FILM COATED, EXTENDED RELEASE ORAL DAILY
Qty: 90 TABLET | Refills: 0 | Status: SHIPPED | OUTPATIENT
Start: 2025-03-05

## 2025-03-05 RX ORDER — TRAZODONE HYDROCHLORIDE 50 MG/1
50 TABLET ORAL NIGHTLY
Qty: 90 TABLET | Refills: 1 | Status: SHIPPED | OUTPATIENT
Start: 2025-03-05

## 2025-03-05 ASSESSMENT — ANXIETY QUESTIONNAIRES
3. WORRYING TOO MUCH ABOUT DIFFERENT THINGS: SEVERAL DAYS
4. TROUBLE RELAXING: SEVERAL DAYS
IF YOU CHECKED OFF ANY PROBLEMS ON THIS QUESTIONNAIRE, HOW DIFFICULT HAVE THESE PROBLEMS MADE IT FOR YOU TO DO YOUR WORK, TAKE CARE OF THINGS AT HOME, OR GET ALONG WITH OTHER PEOPLE: SOMEWHAT DIFFICULT
7. FEELING AFRAID AS IF SOMETHING AWFUL MIGHT HAPPEN: SEVERAL DAYS
1. FEELING NERVOUS, ANXIOUS, OR ON EDGE: SEVERAL DAYS
2. NOT BEING ABLE TO STOP OR CONTROL WORRYING: SEVERAL DAYS
GAD7 TOTAL SCORE: 7
5. BEING SO RESTLESS THAT IT IS HARD TO SIT STILL: SEVERAL DAYS
6. BECOMING EASILY ANNOYED OR IRRITABLE: SEVERAL DAYS

## 2025-03-05 ASSESSMENT — ENCOUNTER SYMPTOMS
ABDOMINAL PAIN: 0
NAUSEA: 0
CHEST TIGHTNESS: 0
DIARRHEA: 0
EYE DISCHARGE: 0
CONSTIPATION: 0
SHORTNESS OF BREATH: 0
VOMITING: 0

## 2025-03-05 NOTE — PROGRESS NOTES
Son Mcpherson (:  1990) is a 34 y.o. female, Established patient, here for evaluation of the following chief complaint(s):  Anxiety (Still feels like her anxiety is still there- states pristiq did not help at all. Her mind is still racing )         Assessment & Plan  1. Anxiety.  Her anxiety persists despite being on a low dose of Pristiq 25 mg. She reports no issues with chest pain, palpitations, shortness of breath, chest tightness, nausea, vomiting, belly pain, or constipation. She has experienced some improvement in binge eating at night. The dosage of Pristiq will be increased to 50 mg. She is advised to utilize any remaining 25 mg tablets by taking two until the new prescription is filled. A prescription for trazodone will be provided to aid in sleep. She is encouraged to continue reducing caffeine intake and to engage in regular physical activity, such as walking, to help manage her symptoms.    2. Primary insomnia.  She reports difficulty staying asleep and waking up feeling tired. Trazodone will be prescribed to help improve sleep quality. She is advised to take melatonin as needed and to continue reducing caffeine intake, which may help with both her insomnia and anxiety.    3. Hypertension.  Her blood pressure is well-controlled at 110/70 mmHg. No changes to her current hypertension management are necessary at this time.    4. Gastroesophageal reflux disease.  She takes Nexium over the counter as needed, particularly when anticipating meals that may trigger symptoms. She has been reducing her caffeine intake, which has helped with her acid reflux. She is advised to continue this regimen and to consider diluting Gatorade with water to reduce sodium intake.    Follow-up  The patient will follow up in 6 to 8 weeks.    Results    1. Adjustment disorder with mixed emotional features  -     desvenlafaxine succinate (PRISTIQ) 50 MG TB24 extended release tablet; Take 1 tablet by mouth daily, Disp-90

## 2025-03-05 NOTE — PROGRESS NOTES
Health Maintenance Due   Topic Date Due    Varicella vaccine (1 of 2 - 13+ 2-dose series) Never done    Hepatitis C screen  Never done    Hepatitis B vaccine (1 of 3 - 19+ 3-dose series) Never done    Pneumococcal 0-49 years Vaccine (1 of 2 - PCV) Never done    COVID-19 Vaccine (1 - 2024-25 season) Never done

## 2025-04-03 ENCOUNTER — HOSPITAL ENCOUNTER (EMERGENCY)
Age: 35
Discharge: HOME | End: 2025-04-03
Payer: COMMERCIAL

## 2025-04-03 VITALS
DIASTOLIC BLOOD PRESSURE: 83 MMHG | RESPIRATION RATE: 16 BRPM | WEIGHT: 132 LBS | TEMPERATURE: 98.3 F | BODY MASS INDEX: 24.29 KG/M2 | SYSTOLIC BLOOD PRESSURE: 128 MMHG | HEIGHT: 62 IN | HEART RATE: 88 BPM | OXYGEN SATURATION: 99 %

## 2025-04-03 DIAGNOSIS — K13.0 LIP LESION: Primary | ICD-10-CM

## 2025-04-03 PROCEDURE — 99213 OFFICE O/P EST LOW 20 MIN: CPT | Performed by: NURSE PRACTITIONER

## 2025-04-03 PROCEDURE — 99213 OFFICE O/P EST LOW 20 MIN: CPT

## 2025-04-03 RX ORDER — PREDNISONE 20 MG/1
20 TABLET ORAL DAILY
Qty: 7 TABLET | Refills: 0 | Status: SHIPPED | OUTPATIENT
Start: 2025-04-03 | End: 2025-04-10

## 2025-04-03 RX ORDER — ACYCLOVIR 800 MG/1
800 TABLET ORAL 2 TIMES DAILY
Qty: 14 TABLET | Refills: 0 | Status: SHIPPED | OUTPATIENT
Start: 2025-04-03 | End: 2025-04-10

## 2025-04-03 RX ORDER — IBUPROFEN 400 MG/1
400 TABLET, FILM COATED ORAL EVERY 6 HOURS PRN
COMMUNITY

## 2025-04-03 ASSESSMENT — PAIN DESCRIPTION - DESCRIPTORS: DESCRIPTORS: DISCOMFORT

## 2025-04-03 ASSESSMENT — ENCOUNTER SYMPTOMS
SHORTNESS OF BREATH: 0
VOMITING: 0
NAUSEA: 0
SORE THROAT: 0
RHINORRHEA: 0
COUGH: 0
CHEST TIGHTNESS: 0
DIARRHEA: 0

## 2025-04-03 ASSESSMENT — PAIN - FUNCTIONAL ASSESSMENT
PAIN_FUNCTIONAL_ASSESSMENT: ACTIVITIES ARE NOT PREVENTED
PAIN_FUNCTIONAL_ASSESSMENT: WONG-BAKER FACES

## 2025-04-03 ASSESSMENT — PAIN DESCRIPTION - LOCATION: LOCATION: OTHER (COMMENT)

## 2025-04-03 ASSESSMENT — PAIN DESCRIPTION - PAIN TYPE: TYPE: ACUTE PAIN

## 2025-04-03 ASSESSMENT — PAIN SCALES - WONG BAKER: WONGBAKER_NUMERICALRESPONSE: HURTS A LITTLE BIT

## 2025-04-03 NOTE — ED TRIAGE NOTES
Patient ambulated to room with complaint of cold sore that started 2 weeks ago. States 2 days ago she started having an itchy throat as well

## 2025-04-03 NOTE — ED PROVIDER NOTES
[facility] URGENT CARE  Urgent Care Encounter       CHIEF COMPLAINT       Chief Complaint   Patient presents with    Mouth Lesions    Pharyngitis       Nurses Notes reviewed and I agree except as noted in the HPI.  HISTORY OF PRESENT ILLNESS   Son Mcpherson is a 34 y.o. female who presents to the [facility] urgent care for evaluation of a lip lesion.  Patient reports the lip lesion started roughly 1 week ago.  She does report a history of anxiety.  She does report when she has work she picks at her lip where the lesion is.  Patient does appear very anxious about the lip lesion.  She reports that she did develop some mild pharyngitis yesterday.  She believes that she may have swallowed some of the medication that she was putting on her lip.    The history is provided by the patient. No  was used.       REVIEW OF SYSTEMS     Review of Systems   Constitutional:  Negative for activity change, appetite change, chills, fatigue and fever.   HENT:  Positive for mouth sores. Negative for ear discharge, ear pain, rhinorrhea and sore throat.    Respiratory:  Negative for cough, chest tightness and shortness of breath.    Cardiovascular:  Negative for chest pain.   Gastrointestinal:  Negative for diarrhea, nausea and vomiting.   Genitourinary:  Negative for dysuria.   Skin:  Negative for rash.   Allergic/Immunologic: Negative for environmental allergies and food allergies.   Neurological:  Negative for dizziness and headaches.       PAST MEDICAL HISTORY         Diagnosis Date    Anxiety     Mental disorder        SURGICALHISTORY     Patient  has no past surgical history on file.    CURRENT MEDICATIONS       Discharge Medication List as of 4/3/2025  7:16 PM        CONTINUE these medications which have NOT CHANGED    Details   ibuprofen (ADVIL;MOTRIN) 400 MG tablet Take 1 tablet by mouth every 6 hours as needed for PainHistorical Med      Escitalopram Oxalate (LEXAPRO PO) Take by mouthHistorical Med     R-0Normal      predniSONE (DELTASONE) 20 MG tablet Take 1 tablet by mouth daily for 7 days, Disp-7 tablet, R-0Normal             Discharge Medication List as of 4/3/2025  7:16 PM          Discharge Medication List as of 4/3/2025  7:16 PM          MARISOL Ruano - CNP    (Please note that portions of this note were completed with a voice recognition program. Efforts were made to edit the dictations but occasionally words are mis-transcribed.)           Mike Hay, APRN - CNP  04/03/25 1935

## 2025-04-16 ENCOUNTER — OFFICE VISIT (OUTPATIENT)
Dept: FAMILY MEDICINE CLINIC | Age: 35
End: 2025-04-16

## 2025-04-16 VITALS
HEIGHT: 62 IN | WEIGHT: 138.6 LBS | OXYGEN SATURATION: 98 % | DIASTOLIC BLOOD PRESSURE: 62 MMHG | HEART RATE: 61 BPM | BODY MASS INDEX: 25.51 KG/M2 | RESPIRATION RATE: 20 BRPM | SYSTOLIC BLOOD PRESSURE: 116 MMHG | TEMPERATURE: 98.2 F

## 2025-04-16 DIAGNOSIS — F51.01 PRIMARY INSOMNIA: ICD-10-CM

## 2025-04-16 DIAGNOSIS — F41.1 GAD (GENERALIZED ANXIETY DISORDER): ICD-10-CM

## 2025-04-16 DIAGNOSIS — I10 ESSENTIAL HYPERTENSION: ICD-10-CM

## 2025-04-16 DIAGNOSIS — B00.1 COLD SORE: Primary | ICD-10-CM

## 2025-04-16 DIAGNOSIS — F43.29 ADJUSTMENT DISORDER WITH MIXED EMOTIONAL FEATURES: ICD-10-CM

## 2025-04-16 DIAGNOSIS — K21.9 GASTROESOPHAGEAL REFLUX DISEASE, UNSPECIFIED WHETHER ESOPHAGITIS PRESENT: ICD-10-CM

## 2025-04-16 RX ORDER — VALACYCLOVIR HYDROCHLORIDE 500 MG/1
500 TABLET, FILM COATED ORAL DAILY
Qty: 30 TABLET | Refills: 5 | Status: SHIPPED | OUTPATIENT
Start: 2025-04-16

## 2025-04-16 RX ORDER — DESVENLAFAXINE 25 MG/1
25 TABLET, EXTENDED RELEASE ORAL DAILY
Qty: 30 TABLET | Refills: 5 | Status: SHIPPED | OUTPATIENT
Start: 2025-04-16

## 2025-04-16 ASSESSMENT — ENCOUNTER SYMPTOMS
RHINORRHEA: 0
COUGH: 0
EYE DISCHARGE: 0
CHEST TIGHTNESS: 0
SHORTNESS OF BREATH: 0
ABDOMINAL PAIN: 0
DIARRHEA: 0
CONSTIPATION: 0
VOMITING: 0

## 2025-04-16 NOTE — PROGRESS NOTES
Health Maintenance Due   Topic Date Due    Varicella vaccine (1 of 2 - 13+ 2-dose series) Never done    Hepatitis C screen  Never done    Hepatitis B vaccine (1 of 3 - 19+ 3-dose series) Never done    Pneumococcal 0-49 years Vaccine (1 of 2 - PCV) Never done    COVID-19 Vaccine (1 - 2024-25 season) Never done       
General: Bowel sounds are normal.      Palpations: Abdomen is soft.      Tenderness: There is no abdominal tenderness.   Musculoskeletal:         General: Normal range of motion.      Cervical back: Normal range of motion and neck supple. No spinous process tenderness.   Skin:     General: Skin is warm and dry.      Findings: No erythema or rash.   Neurological:      Mental Status: She is alert and oriented to person, place, and time.   Psychiatric:         Speech: Speech normal.         Behavior: Behavior normal.         Thought Content: Thought content normal.                The patient (or guardian, if applicable) and other individuals in attendance with the patient were advised that Artificial Intelligence will be utilized during this visit to record, process the conversation to generate a clinical note and to support improvement of the AI technology. The patient (or guardian, if applicable) and other individuals in attendance at the appointment consented to the use of AI, including the recording.      An electronic signature was used to authenticate this note.    --AVELINA PAULA, MARISOL - CNP

## 2025-04-21 DIAGNOSIS — F51.01 PRIMARY INSOMNIA: ICD-10-CM

## 2025-04-21 RX ORDER — TRAZODONE HYDROCHLORIDE 50 MG/1
50 TABLET ORAL NIGHTLY
Qty: 90 TABLET | Refills: 1 | Status: SHIPPED | OUTPATIENT
Start: 2025-04-21

## 2025-05-28 ENCOUNTER — OFFICE VISIT (OUTPATIENT)
Dept: FAMILY MEDICINE CLINIC | Age: 35
End: 2025-05-28
Payer: COMMERCIAL

## 2025-05-28 VITALS
DIASTOLIC BLOOD PRESSURE: 60 MMHG | TEMPERATURE: 97.9 F | SYSTOLIC BLOOD PRESSURE: 100 MMHG | WEIGHT: 134.6 LBS | HEIGHT: 62 IN | BODY MASS INDEX: 24.77 KG/M2 | RESPIRATION RATE: 16 BRPM | HEART RATE: 89 BPM | OXYGEN SATURATION: 98 %

## 2025-05-28 DIAGNOSIS — B00.1 COLD SORE: ICD-10-CM

## 2025-05-28 DIAGNOSIS — F43.29 ADJUSTMENT DISORDER WITH MIXED EMOTIONAL FEATURES: ICD-10-CM

## 2025-05-28 DIAGNOSIS — F51.01 PRIMARY INSOMNIA: Primary | ICD-10-CM

## 2025-05-28 DIAGNOSIS — F41.1 GAD (GENERALIZED ANXIETY DISORDER): ICD-10-CM

## 2025-05-28 DIAGNOSIS — L30.9 DERMATITIS: ICD-10-CM

## 2025-05-28 DIAGNOSIS — K21.9 GASTROESOPHAGEAL REFLUX DISEASE, UNSPECIFIED WHETHER ESOPHAGITIS PRESENT: ICD-10-CM

## 2025-05-28 PROCEDURE — 3074F SYST BP LT 130 MM HG: CPT | Performed by: NURSE PRACTITIONER

## 2025-05-28 PROCEDURE — 99214 OFFICE O/P EST MOD 30 MIN: CPT | Performed by: NURSE PRACTITIONER

## 2025-05-28 PROCEDURE — 3078F DIAST BP <80 MM HG: CPT | Performed by: NURSE PRACTITIONER

## 2025-05-28 RX ORDER — DESVENLAFAXINE 25 MG/1
25 TABLET, EXTENDED RELEASE ORAL DAILY
Qty: 90 TABLET | Refills: 1 | Status: SHIPPED | OUTPATIENT
Start: 2025-05-28

## 2025-05-28 RX ORDER — PREDNISONE 20 MG/1
20 TABLET ORAL 2 TIMES DAILY
Qty: 10 TABLET | Refills: 0 | Status: SHIPPED | OUTPATIENT
Start: 2025-05-28 | End: 2025-06-02

## 2025-05-28 RX ORDER — DESVENLAFAXINE 50 MG/1
50 TABLET, FILM COATED, EXTENDED RELEASE ORAL DAILY
Qty: 90 TABLET | Refills: 1 | Status: SHIPPED | OUTPATIENT
Start: 2025-05-28

## 2025-05-28 RX ORDER — TRAZODONE HYDROCHLORIDE 100 MG/1
100 TABLET ORAL NIGHTLY
Qty: 90 TABLET | Refills: 1 | Status: SHIPPED | OUTPATIENT
Start: 2025-05-28

## 2025-05-28 ASSESSMENT — ENCOUNTER SYMPTOMS
EYE DISCHARGE: 0
CONSTIPATION: 0
CHEST TIGHTNESS: 0
RHINORRHEA: 0
VOMITING: 0
DIARRHEA: 0
SHORTNESS OF BREATH: 0
COUGH: 0
ABDOMINAL PAIN: 0

## 2025-05-28 NOTE — PROGRESS NOTES
Son Mcpherson (:  1990) is a 34 y.o. female, Established patient, here for evaluation of the following chief complaint(s):  Follow-up Chronic Condition (3 month follow up ), Mental Health (Has been good. Has been doubling the trazdone and notes has been helping a lot better. ), Anxiety (Getting better, once a week will have an episode  ), and Rash (Has been getting a rash on her chest. Has been everyday for the last month. Denies any itching )         Assessment & Plan  1. Anxiety.  - Anxiety is generally well-controlled with the current medication regimen.  - Experiences occasional episodes of heightened anxiety, approximately once a week, often triggered by being away from her child.  - Current dosing of Pristiq and trazodone will be continued.  - No changes to the medication regimen are necessary at this time.    2. Sleep issues.  - Doubling trazodone dose to 100 mg at night has been effective in helping her fall asleep and stay asleep without next-day drowsiness.  - Physical exam findings confirm no next-day drowsiness.  - Discussion confirms the effectiveness of the current trazodone regimen.  - Trazodone dosage will be officially adjusted to 100 mg nightly.    3. Cold sores.  - Currently on Valtrex for preventative therapy for cold sores.  - No new symptoms or issues reported.  - Continued use of Valtrex as prescribed.  - No changes to the medication regimen are necessary at this time.    4. Acid reflux.  - Takes Nexium as needed, particularly when eating out, which helps manage her symptoms.  - Symptoms are well-managed with current Nexium use.  - Discussion confirms the effectiveness of Nexium when taken as needed.  - Continued use of Nexium as needed, no changes to the medication regimen.    5. Rash.  - Rash does not appear to be bacterial or fungal in nature.  - Physical exam findings show the rash is localized to the torso and back, with occasional itching.  - Discussion suggests the rash may

## 2025-08-06 DIAGNOSIS — F51.01 PRIMARY INSOMNIA: ICD-10-CM

## 2025-08-07 RX ORDER — TRAZODONE HYDROCHLORIDE 100 MG/1
100 TABLET ORAL NIGHTLY
Qty: 90 TABLET | Refills: 1 | Status: SHIPPED | OUTPATIENT
Start: 2025-08-07

## 2025-08-28 ENCOUNTER — OFFICE VISIT (OUTPATIENT)
Dept: FAMILY MEDICINE CLINIC | Age: 35
End: 2025-08-28
Payer: COMMERCIAL

## 2025-08-28 VITALS
SYSTOLIC BLOOD PRESSURE: 122 MMHG | WEIGHT: 134 LBS | OXYGEN SATURATION: 99 % | RESPIRATION RATE: 16 BRPM | HEART RATE: 72 BPM | DIASTOLIC BLOOD PRESSURE: 64 MMHG | BODY MASS INDEX: 24.5 KG/M2

## 2025-08-28 DIAGNOSIS — F51.01 PRIMARY INSOMNIA: Primary | ICD-10-CM

## 2025-08-28 DIAGNOSIS — F41.1 GAD (GENERALIZED ANXIETY DISORDER): ICD-10-CM

## 2025-08-28 DIAGNOSIS — F43.29 ADJUSTMENT DISORDER WITH MIXED EMOTIONAL FEATURES: ICD-10-CM

## 2025-08-28 DIAGNOSIS — K21.9 GASTROESOPHAGEAL REFLUX DISEASE, UNSPECIFIED WHETHER ESOPHAGITIS PRESENT: ICD-10-CM

## 2025-08-28 DIAGNOSIS — I10 ESSENTIAL HYPERTENSION: ICD-10-CM

## 2025-08-28 PROCEDURE — 3074F SYST BP LT 130 MM HG: CPT | Performed by: NURSE PRACTITIONER

## 2025-08-28 PROCEDURE — 3078F DIAST BP <80 MM HG: CPT | Performed by: NURSE PRACTITIONER

## 2025-08-28 PROCEDURE — 99214 OFFICE O/P EST MOD 30 MIN: CPT | Performed by: NURSE PRACTITIONER

## 2025-08-28 ASSESSMENT — ENCOUNTER SYMPTOMS
ABDOMINAL PAIN: 0
EYE DISCHARGE: 0
VOMITING: 0
RHINORRHEA: 0
DIARRHEA: 0
CONSTIPATION: 0
CHEST TIGHTNESS: 0
COUGH: 0
SHORTNESS OF BREATH: 0